# Patient Record
Sex: FEMALE | Race: WHITE | NOT HISPANIC OR LATINO | Employment: FULL TIME | URBAN - METROPOLITAN AREA
[De-identification: names, ages, dates, MRNs, and addresses within clinical notes are randomized per-mention and may not be internally consistent; named-entity substitution may affect disease eponyms.]

---

## 2017-02-15 ENCOUNTER — OFFICE VISIT (OUTPATIENT)
Dept: ENDOCRINOLOGY | Facility: CLINIC | Age: 42
End: 2017-02-15
Payer: COMMERCIAL

## 2017-02-15 VITALS
HEIGHT: 65 IN | BODY MASS INDEX: 32.91 KG/M2 | DIASTOLIC BLOOD PRESSURE: 76 MMHG | SYSTOLIC BLOOD PRESSURE: 108 MMHG | HEART RATE: 88 BPM | RESPIRATION RATE: 16 BRPM | WEIGHT: 197.56 LBS

## 2017-02-15 DIAGNOSIS — E55.9 VITAMIN D DEFICIENCY: ICD-10-CM

## 2017-02-15 DIAGNOSIS — E66.9 OBESITY, CLASS I, BMI 30-34.9: ICD-10-CM

## 2017-02-15 DIAGNOSIS — E03.4 HYPOTHYROIDISM DUE TO ACQUIRED ATROPHY OF THYROID: ICD-10-CM

## 2017-02-15 PROBLEM — E66.811 OBESITY, CLASS I, BMI 30-34.9: Status: ACTIVE | Noted: 2017-02-15

## 2017-02-15 PROCEDURE — 99204 OFFICE O/P NEW MOD 45 MIN: CPT | Mod: S$GLB,,, | Performed by: INTERNAL MEDICINE

## 2017-02-15 PROCEDURE — 99999 PR PBB SHADOW E&M-NEW PATIENT-LVL III: CPT | Mod: PBBFAC,,, | Performed by: INTERNAL MEDICINE

## 2017-02-15 RX ORDER — LEVOTHYROXINE SODIUM 175 UG/1
175 TABLET ORAL DAILY
COMMUNITY
End: 2017-02-16 | Stop reason: DRUGHIGH

## 2017-02-15 NOTE — MR AVS SNAPSHOT
[facility]  [address]  [facility] [address]  Phone: [phone]  Fax: [phone]                  Zenaida Sung   2/15/2017 8:00 AM   Office Visit    Description:  Female : 1975   Provider:  Evelina Elam NP   Department:  [facility]           Reason for Visit     Hypothyroidism           Diagnoses this Visit        Comments    Hypothyroidism due to acquired atrophy of thyroid         Vitamin D deficiency         Obesity, Class I, BMI 30-34.9                To Do List           Future Appointments        Provider Department Dept Phone    2/15/2017 9:40 AM LAB, APPOINTMENT [place] [facility] [phone]      Goals (5 Years of Data)     None      Follow-Up and Disposition     Return in about 6 months (around 8/15/2017).      [facility]     [facility] Care [facility] - 24/ Assistance  Registered nurses in the [facility] provide clinical advisement, health education, appointment booking, and other advisory services.  Call for this free service at [phone].             Medications           Message regarding Medications     Verify the changes and/or additions to your medication regime listed below are the same as discussed with your clinician today.  If any of these changes or additions are incorrect, please notify your healthcare provider.             Verify that the below list of medications is an accurate representation of the medications you are currently taking.  If none reported, the list may be blank. If incorrect, please contact your healthcare provider. Carry this list with you in case of emergency.           Current Medications     levothyroxine (SYNTHROID, LEVOTHROID) 175 MCG tablet Take 175 mcg by mouth once daily.           Clinical Reference Information           Your Vitals Were     BP Pulse Resp Height Weight BMI    108/76 (BP Location: Left arm, Patient Position:  "Sitting, BP Method: Manual) 88 16 5' 5" (1.651 m) 89.6 kg (197 lb 8.5 oz) 32.87 kg/m2      Blood Pressure          Most Recent Value    BP  108/76      Allergies as of 2/15/2017     No Known Allergies      Immunizations Administered on Date of Encounter - 2/15/2017     None      Orders Placed During Today's Visit     Future Labs/Procedures Expected by Expires    Hemoglobin A1c  2/15/2017 4/16/2018    Thyroid peroxidase antibody  2/15/2017 4/16/2018    TSH  2/15/2017 (Approximate) 2/10/2018    Vitamin D  2/15/2017 4/16/2018      Language Assistance Services     ATTENTION: Language assistance services are available, free of charge. Please call [phone].      ATENCIÓN: Si veronala barry, tiene a munoz disposición servicios gratuitos de asistencia lingüística. Llame al [phone].     CHÚ Ý: N?u b?n nói Ti?ng Vi?t, có các d?ch v? h? tr? ngôn ng? mi?n phí dành cho b?n. G?i s? [phone].         [facility] complies with applicable Federal civil rights laws and does not discriminate on the basis of race, color, national origin, age, disability, or sex.        "

## 2017-02-15 NOTE — PROGRESS NOTES
Chief Complaint: Hypothyroidism      HPI:  Zenaida Sung is 41 y.o. female with preexisting thyroid disease. Diagnosed in  , presented with symptoms of weight gain of 50+ pounds over 6 months and fatigue   Found to abnormal TFTs on routine labs performed and was found to be hypothyroid    Current Medications: Levothyroxine 175 mcg - 1 tab po daily     Patient reports good compliance with taking medication as prescribed   Takes LT4 separate from food and other medications first thing in the morning with coffee. Pt reports waiting 45mins - 1 hour before eating or taking other medications      Patient endorses dry skin  +inability to lose weight   + thinning hair and nails   + cold intolerance   + fatigue       Review of Systems   Constitutional: Positive for fatigue. Negative for unexpected weight change.   HENT: Negative for trouble swallowing and voice change.    Eyes: Negative for visual disturbance.   Respiratory: Negative for cough and shortness of breath.    Cardiovascular: Negative for chest pain and palpitations.   Gastrointestinal: Positive for abdominal distention. Negative for abdominal pain, constipation, diarrhea, nausea and vomiting.   Endocrine: Positive for cold intolerance. Negative for heat intolerance, polydipsia, polyphagia and polyuria.   Genitourinary: Negative for dysuria and menstrual problem.   Musculoskeletal: Negative for back pain, neck pain and neck stiffness.   Skin: Negative for rash and wound.   Allergic/Immunologic: Negative for immunocompromised state.   Neurological: Negative for dizziness, tremors, weakness, light-headedness and headaches.   Hematological: Does not bruise/bleed easily.   Psychiatric/Behavioral: Negative for confusion and sleep disturbance.     Family history of thyroid disease: Yes, 1 cousin with Hypothyroidism     Menses: Regular   LMP: 2016   Reproductive history:        Physical Exam   Constitutional: She is oriented to person, place, and  time. She appears well-developed and well-nourished. No distress.   HENT:   Right Ear: External ear normal.   Left Ear: External ear normal.   Nose: Nose normal.   Hearing normal  Dentition good    Neck: Normal range of motion. Neck supple. No tracheal deviation present. No thyromegaly present.   Cardiovascular: Normal rate and regular rhythm.    No murmur heard.  Pulmonary/Chest: Effort normal and breath sounds normal.   Abdominal: Soft. There is no tenderness. No hernia.   Musculoskeletal: She exhibits no edema.   Gait normal  No clubbing or cyanosis noted   Lymphadenopathy:     She has no cervical adenopathy.   Neurological: She is alert and oriented to person, place, and time. She displays normal reflexes. No cranial nerve deficit.   Skin: Skin is warm and dry. No rash noted.   No nodules       Psychiatric: She has a normal mood and affect. Judgment normal.   Nursing note and vitals reviewed.      Assessment and Plan:  1. Hypothyroidism due to acquired atrophy of thyroid  - check lab today along with TPO AB   - clinically euthyroid   - discussed that symptoms of hypothyroidism may not always correlate with TSH and a normal TSH is the goal of therapy   - reviewed usual times of thyroid hormone changes: weight gain, start /stop OCP's , attempting conception and pregnancy   - avoid exogenous hyperthyroidism as this can accelerate bone loss and increase CV complications   -     TSH; Future; Expected date: 2/15/17  -     Thyroid peroxidase antibody; Future; Expected date: 2/15/17  -     Hemoglobin A1c; Future; Expected date: 2/15/17    2. Vitamin D deficiency  -     Vitamin D; Future; Expected date: 2/15/17    3. Obesity, Class I, BMI 30-34.9  - increases insulin resistance   - alerted as to the increased risk of diabetes   - recommend periodic A1c  - encouraged exercise and weight loss   - encouraged to keep food diary   -     Hemoglobin A1c; Future; Expected date: 2/15/17        Case discussed in consultation with  Dr. Santiago   Recommendations were discussed with the patient in detail   The patient verbalized understanding and agrees with the treatment plan as outlined above     RTC in 6 months for follow up with labs prior

## 2017-02-16 ENCOUNTER — PATIENT MESSAGE (OUTPATIENT)
Dept: ENDOCRINOLOGY | Facility: CLINIC | Age: 42
End: 2017-02-16

## 2017-02-16 DIAGNOSIS — E03.4 HYPOTHYROIDISM DUE TO ACQUIRED ATROPHY OF THYROID: Primary | ICD-10-CM

## 2017-02-16 RX ORDER — LEVOTHYROXINE SODIUM 150 UG/1
150 TABLET ORAL DAILY
Qty: 30 TABLET | Refills: 1 | Status: SHIPPED | OUTPATIENT
Start: 2017-02-16 | End: 2017-04-19 | Stop reason: SDUPTHER

## 2017-02-17 ENCOUNTER — PATIENT MESSAGE (OUTPATIENT)
Dept: ENDOCRINOLOGY | Facility: CLINIC | Age: 42
End: 2017-02-17

## 2017-04-19 DIAGNOSIS — E03.4 HYPOTHYROIDISM DUE TO ACQUIRED ATROPHY OF THYROID: ICD-10-CM

## 2017-04-19 RX ORDER — LEVOTHYROXINE SODIUM 150 UG/1
TABLET ORAL
Qty: 30 TABLET | Refills: 0 | Status: SHIPPED | OUTPATIENT
Start: 2017-04-19 | End: 2017-05-08 | Stop reason: DRUGHIGH

## 2017-05-08 ENCOUNTER — PATIENT MESSAGE (OUTPATIENT)
Dept: ENDOCRINOLOGY | Facility: CLINIC | Age: 42
End: 2017-05-08

## 2017-05-08 ENCOUNTER — LAB VISIT (OUTPATIENT)
Dept: LAB | Facility: HOSPITAL | Age: 42
End: 2017-05-08
Payer: COMMERCIAL

## 2017-05-08 DIAGNOSIS — E03.4 HYPOTHYROIDISM DUE TO ACQUIRED ATROPHY OF THYROID: Primary | ICD-10-CM

## 2017-05-08 DIAGNOSIS — E03.4 HYPOTHYROIDISM DUE TO ACQUIRED ATROPHY OF THYROID: ICD-10-CM

## 2017-05-08 LAB
T4 FREE SERPL-MCNC: 1.43 NG/DL
TSH SERPL DL<=0.005 MIU/L-ACNC: <0.01 UIU/ML

## 2017-05-08 PROCEDURE — 84443 ASSAY THYROID STIM HORMONE: CPT

## 2017-05-08 PROCEDURE — 36415 COLL VENOUS BLD VENIPUNCTURE: CPT

## 2017-05-08 PROCEDURE — 84439 ASSAY OF FREE THYROXINE: CPT

## 2017-05-08 RX ORDER — LEVOTHYROXINE SODIUM 125 UG/1
125 TABLET ORAL
Qty: 30 TABLET | Refills: 3 | Status: SHIPPED | OUTPATIENT
Start: 2017-05-08 | End: 2017-08-07 | Stop reason: DRUGHIGH

## 2017-07-27 ENCOUNTER — PATIENT MESSAGE (OUTPATIENT)
Dept: ENDOCRINOLOGY | Facility: CLINIC | Age: 42
End: 2017-07-27

## 2017-08-04 ENCOUNTER — LAB VISIT (OUTPATIENT)
Dept: LAB | Facility: HOSPITAL | Age: 42
End: 2017-08-04
Payer: COMMERCIAL

## 2017-08-04 DIAGNOSIS — E03.4 HYPOTHYROIDISM DUE TO ACQUIRED ATROPHY OF THYROID: ICD-10-CM

## 2017-08-04 LAB
T4 FREE SERPL-MCNC: 1.17 NG/DL
TSH SERPL DL<=0.005 MIU/L-ACNC: 0.11 UIU/ML

## 2017-08-04 PROCEDURE — 84439 ASSAY OF FREE THYROXINE: CPT

## 2017-08-04 PROCEDURE — 36415 COLL VENOUS BLD VENIPUNCTURE: CPT

## 2017-08-04 PROCEDURE — 84443 ASSAY THYROID STIM HORMONE: CPT

## 2017-08-07 ENCOUNTER — PATIENT MESSAGE (OUTPATIENT)
Dept: ENDOCRINOLOGY | Facility: CLINIC | Age: 42
End: 2017-08-07

## 2017-08-07 DIAGNOSIS — E03.4 HYPOTHYROIDISM DUE TO ACQUIRED ATROPHY OF THYROID: Primary | ICD-10-CM

## 2017-08-07 RX ORDER — LEVOTHYROXINE SODIUM 112 UG/1
112 TABLET ORAL
Qty: 30 TABLET | Refills: 6 | Status: SHIPPED | OUTPATIENT
Start: 2017-08-07 | End: 2018-03-27 | Stop reason: SDUPTHER

## 2017-10-09 ENCOUNTER — PATIENT MESSAGE (OUTPATIENT)
Dept: ENDOCRINOLOGY | Facility: CLINIC | Age: 42
End: 2017-10-09

## 2017-10-25 ENCOUNTER — PATIENT MESSAGE (OUTPATIENT)
Dept: ENDOCRINOLOGY | Facility: CLINIC | Age: 42
End: 2017-10-25

## 2017-10-25 DIAGNOSIS — E03.4 HYPOTHYROIDISM DUE TO ACQUIRED ATROPHY OF THYROID: Primary | ICD-10-CM

## 2017-12-01 ENCOUNTER — HOSPITAL ENCOUNTER (OUTPATIENT)
Dept: RADIOLOGY | Facility: HOSPITAL | Age: 42
Discharge: HOME | End: 2017-12-01
Attending: PHYSICIAN ASSISTANT
Payer: COMMERCIAL

## 2017-12-01 ENCOUNTER — OFFICE VISIT (OUTPATIENT)
Dept: ORTHOPEDICS | Facility: CLINIC | Age: 42
End: 2017-12-01
Payer: COMMERCIAL

## 2017-12-01 DIAGNOSIS — M25.569 KNEE PAIN, UNSPECIFIED CHRONICITY, UNSPECIFIED LATERALITY: ICD-10-CM

## 2017-12-01 DIAGNOSIS — M17.0 PRIMARY OSTEOARTHRITIS OF BOTH KNEES: Primary | ICD-10-CM

## 2017-12-01 PROCEDURE — 73564 X-RAY EXAM KNEE 4 OR MORE: CPT | Mod: 26,50,, | Performed by: RADIOLOGY

## 2017-12-01 PROCEDURE — 20610 DRAIN/INJ JOINT/BURSA W/O US: CPT | Mod: 50,S$GLB,, | Performed by: PHYSICIAN ASSISTANT

## 2017-12-01 PROCEDURE — 99999 PR PBB SHADOW E&M-EST. PATIENT-LVL II: CPT | Mod: PBBFAC,,, | Performed by: PHYSICIAN ASSISTANT

## 2017-12-01 PROCEDURE — 99203 OFFICE O/P NEW LOW 30 MIN: CPT | Mod: 25,S$GLB,, | Performed by: PHYSICIAN ASSISTANT

## 2017-12-01 PROCEDURE — 73564 X-RAY EXAM KNEE 4 OR MORE: CPT | Mod: TC,50

## 2017-12-01 RX ORDER — MELOXICAM 15 MG/1
15 TABLET ORAL DAILY
Qty: 30 TABLET | Refills: 0 | Status: SHIPPED | OUTPATIENT
Start: 2017-12-01 | End: 2018-03-05 | Stop reason: SDUPTHER

## 2017-12-01 RX ORDER — TRIAMCINOLONE ACETONIDE 40 MG/ML
80 INJECTION, SUSPENSION INTRA-ARTICULAR; INTRAMUSCULAR
Status: COMPLETED | OUTPATIENT
Start: 2017-12-01 | End: 2017-12-01

## 2017-12-01 RX ORDER — PHENTERMINE HYDROCHLORIDE 37.5 MG/1
37.5 TABLET ORAL
COMMUNITY
End: 2019-03-20

## 2017-12-01 RX ADMIN — TRIAMCINOLONE ACETONIDE 80 MG: 40 INJECTION, SUSPENSION INTRA-ARTICULAR; INTRAMUSCULAR at 12:12

## 2017-12-01 NOTE — PROGRESS NOTES
SUBJECTIVE:     Chief Complaint : bilateral knee pain     History of Present Illness:  Zenaida Sung is a 42 y.o. female who works for [employer] seen in clinic today with a chief complaint of bilateral knee pain. Pain has been present for over 2 years. Pain is worst when attempting to squat or kneel. She has stopped Robles and running because of the knee pain. After standing for a day her knees swell. She denies catching/locking, instability. She has had a left knee injection 2 years ago that helped some. She takes ibuprofen a few mornings a week.     Past Medical History:   Diagnosis Date    Hypothyroidism        Review of Systems:  Constitutional: no fever or chills  ENT: no nasal congestion or sore throat  Respiratory: no cough or shortness of breath  Cardiovascular: no chest pain or palpitations  Gastrointestinal: no nausea or vomiting, tolerating diet  Genitourinary: no hematuria or dysuria  Integument/Breast: no rash or pruritis  Hematologic/Lymphatic: no easy bruising or lymphadenopathy  Musculoskeletal: see HPI  Neurological: no seizures or tremors  Behavioral/Psych: no auditory or visual hallucinations    OBJECTIVE:     PHYSICAL EXAM:  There were no vitals taken for this visit.   General Appearance: WDWN, NAD  Gait: Normal  Neuro/Psych: Mood & affect appropriate  Lungs: Respirations equal and unlabored.   CV: 2+ bilateral upper and lower extremity pulses.   Skin: Intact throughout LE  Extremities: No LE edema    Right Knee Exam  Range of Motion:0-125 active   Effusion:yes  Condition of skin:intact  Location of tenderness:Patellar tendon   Strength:5 of 5 quadriceps strength and 5 of 5 hamstring strength  Stability:stable to testing  Elissa: negative/negative    Left Knee Exam  Range of Motion:0-125 active   Effusion:none  Condition of skin:intact  Location of tenderness:Patellar tendon   Strength:5 of 5 quadriceps strength and 5 of 5 hamstring strength  Stability:stable to  testing  Elissa: negative/negative    Alignment: valgus in flexion    Right Hip Examination: no pain with PROM     Left Hip Examination: no pain with PROM     RADIOGRAPHS: AP, lateral and merchant bilateral knee x-rays ordered and images reviewed today by me reveal mild-moderate degenerative changes primarily patellofemoral    ASSESSMENT/PLAN:   Patellofemoral arthritis  Patellar tendonitis  - Activity modification. Avoid squats, lunges, jumping, Robles. Will try elliptical, swimming.  - Continue weight loss.   - Steroid injections bilaterally today.  - Euflexxa brochure given. She will call if she would like to try in the future.    Knee Injection Procedure Note    Pre-operative Diagnosis: bilateral knee degenerative arthritis    Post-operative Diagnosis: same    Indications: bilateral knee pain    Anesthesia: none    Procedure Details     Verbal consent was obtained for the procedure. The injection site was identified and the skin was prepared with alcohol. The bilateral knee was injected from an anterolateral approach with 1 ml of Kenalog and 3 ml Lidocaine under sterile technique using a 22 gauge needle. The needle was removed and the area cleansed and dressed.    Complications:  None; patient tolerated the procedure well.    she was advised to rest the knee today, using ice and elevation as needed for comfort and swelling. she did receive immediate relief of the knee pain. she was told this would be short lived and is secondary to the lidocaine. she may have an increase in discomfort tonight followed by steady improvement over the next several days. It may take 1-3 weeks following the injection to get the full benefit of the medication.

## 2018-01-11 ENCOUNTER — PATIENT MESSAGE (OUTPATIENT)
Dept: ENDOCRINOLOGY | Facility: CLINIC | Age: 43
End: 2018-01-11

## 2018-01-12 ENCOUNTER — LAB VISIT (OUTPATIENT)
Dept: LAB | Facility: HOSPITAL | Age: 43
End: 2018-01-12
Payer: COMMERCIAL

## 2018-01-12 DIAGNOSIS — E03.4 HYPOTHYROIDISM DUE TO ACQUIRED ATROPHY OF THYROID: ICD-10-CM

## 2018-01-12 LAB — TSH SERPL DL<=0.005 MIU/L-ACNC: 0.46 UIU/ML

## 2018-01-12 PROCEDURE — 84443 ASSAY THYROID STIM HORMONE: CPT

## 2018-01-12 PROCEDURE — 36415 COLL VENOUS BLD VENIPUNCTURE: CPT

## 2018-03-05 ENCOUNTER — PATIENT MESSAGE (OUTPATIENT)
Dept: ORTHOPEDICS | Facility: CLINIC | Age: 43
End: 2018-03-05

## 2018-03-05 DIAGNOSIS — M17.0 PRIMARY OSTEOARTHRITIS OF BOTH KNEES: ICD-10-CM

## 2018-03-05 DIAGNOSIS — M25.569 KNEE PAIN, UNSPECIFIED CHRONICITY, UNSPECIFIED LATERALITY: ICD-10-CM

## 2018-03-05 RX ORDER — MELOXICAM 15 MG/1
TABLET ORAL
Qty: 30 TABLET | Refills: 0 | Status: SHIPPED | OUTPATIENT
Start: 2018-03-05 | End: 2019-03-20

## 2018-03-27 DIAGNOSIS — E03.4 HYPOTHYROIDISM DUE TO ACQUIRED ATROPHY OF THYROID: ICD-10-CM

## 2018-03-27 RX ORDER — LEVOTHYROXINE SODIUM 112 UG/1
TABLET ORAL
Qty: 30 TABLET | Refills: 6 | Status: SHIPPED | OUTPATIENT
Start: 2018-03-27 | End: 2018-11-08 | Stop reason: SDUPTHER

## 2018-04-18 ENCOUNTER — LAB VISIT (OUTPATIENT)
Dept: LAB | Facility: HOSPITAL | Age: 43
End: 2018-04-18
Attending: ALLERGY & IMMUNOLOGY
Payer: COMMERCIAL

## 2018-04-18 ENCOUNTER — OFFICE VISIT (OUTPATIENT)
Dept: ALLERGY | Facility: CLINIC | Age: 43
End: 2018-04-18
Payer: COMMERCIAL

## 2018-04-18 VITALS — BODY MASS INDEX: 31.44 KG/M2 | WEIGHT: 188.69 LBS | HEART RATE: 100 BPM | OXYGEN SATURATION: 96 % | HEIGHT: 65 IN

## 2018-04-18 DIAGNOSIS — J31.0 OTHER CHRONIC RHINITIS: ICD-10-CM

## 2018-04-18 DIAGNOSIS — E03.4 HYPOTHYROIDISM DUE TO ACQUIRED ATROPHY OF THYROID: ICD-10-CM

## 2018-04-18 DIAGNOSIS — J45.30 MILD PERSISTENT ASTHMA WITHOUT COMPLICATION: Primary | ICD-10-CM

## 2018-04-18 DIAGNOSIS — E66.9 OBESITY, CLASS I, BMI 30-34.9: ICD-10-CM

## 2018-04-18 PROCEDURE — 99999 PR PBB SHADOW E&M-EST. PATIENT-LVL III: CPT | Mod: PBBFAC,,, | Performed by: ALLERGY & IMMUNOLOGY

## 2018-04-18 PROCEDURE — 99203 OFFICE O/P NEW LOW 30 MIN: CPT | Mod: S$GLB,,, | Performed by: ALLERGY & IMMUNOLOGY

## 2018-04-18 PROCEDURE — 86003 ALLG SPEC IGE CRUDE XTRC EA: CPT | Mod: 59

## 2018-04-18 PROCEDURE — 86003 ALLG SPEC IGE CRUDE XTRC EA: CPT

## 2018-04-18 PROCEDURE — 36415 COLL VENOUS BLD VENIPUNCTURE: CPT | Mod: PO

## 2018-04-18 PROCEDURE — 82785 ASSAY OF IGE: CPT

## 2018-04-18 RX ORDER — MONTELUKAST SODIUM 10 MG/1
10 TABLET ORAL NIGHTLY
Qty: 30 TABLET | Refills: 6 | Status: SHIPPED | OUTPATIENT
Start: 2018-04-18

## 2018-04-18 RX ORDER — PREDNISONE 20 MG/1
20 TABLET ORAL 3 TIMES DAILY
Qty: 18 TABLET | Refills: 0 | Status: SHIPPED | OUTPATIENT
Start: 2018-04-18 | End: 2018-04-24

## 2018-04-18 RX ORDER — FLUTICASONE PROPIONATE AND SALMETEROL 250; 50 UG/1; UG/1
1 POWDER RESPIRATORY (INHALATION) 2 TIMES DAILY
Qty: 1 EACH | Refills: 6 | Status: SHIPPED | OUTPATIENT
Start: 2018-04-18 | End: 2018-05-09

## 2018-04-18 RX ORDER — CETIRIZINE HYDROCHLORIDE 10 MG/1
10 TABLET ORAL DAILY
COMMUNITY

## 2018-04-18 RX ORDER — FLUTICASONE PROPIONATE 50 MCG
1 SPRAY, SUSPENSION (ML) NASAL DAILY
COMMUNITY

## 2018-04-18 RX ORDER — GUAIFENESIN 600 MG/1
600 TABLET, EXTENDED RELEASE ORAL 2 TIMES DAILY
COMMUNITY
End: 2018-05-09

## 2018-04-18 NOTE — PROGRESS NOTES
Referring physician: No ref. provider found    Primary Care Physician: Primary Doctor No    Chief Complaint: Sinus Problem (headaches and pressure); Wheezing; and Cough    Patient is new to me.  Patient is not accompanied    Subjective:         HPI    Zenaida Sung is a 42 y.o. female self-referred for evaluation of respiratory symptoms that started approximately 4 weeks ago.  She stated that it began as it itchy throat with sneezing which persisted then she developed some wheezing which responds to albuterol.  She states the wheezing occurs in the a.m. when she gets up and at bedtime before she goes to sleep; she uses albuterol these times with benefit.  She also reports having a persistent recurrent cough.  She has been on Zyrtec at bedtime for the past 4 weeks as well as Flonase 2 sprays in each nostril every a.m.  At time she uses the Flonase twice a day.  She has been using the albuterol for the past 4 weeks on an as-needed basis.  The albuterol she has been using was previously prescribed to her wife.  Patient states that she has never been diagnosed with asthma per se; this is the first time she's actually had wheezing.  The patient does report that  during pregnancy 19 years ago she had itchy watery eyes along with sneezing which lasted throughout her pregnancy   And resolved after delivery.  She also states that she had bronchitis in December 2005 as well as several sinus infections from 2005 thru 2006.    She states that in December 2017 both she and her wife became [place] seen in urgent care.  The doctor to urgent care gave her a steroid injection as well as an antibiotic.  Patient reports that she got completely well after this treatment.    Her current symptoms include nasal congestion, rhinorrhea, postnasal drip, frequent clearing of her throat, as well as shortness of breath, coughing, and wheezing.  She also states that she has a headache frequently in the last 2 weeks at the top of her  head.  She also reports watery eyes.    Her father has a history of hayfever as well as her brother.  She states that her brother got ALLERGY injections when he was a teenager.  Both her father and her brother have sleep apnea and on CPAP.    The only new environmental change is that she has gotten a new kitten (Bengal) approximately 3 weeks ago; her nasal symptoms started prior to obtaining this kitten but perhaps her wheezing occurred afterwards.    .  She's had cats in the past without, so she does not think this Is causing her issues.            Review of Systems  Constitutional: Negative for changes in appetite, unintentional weight loss, fever, chills and fatigue.   HENT: Negative for facial pain, nose bleeds, sinus pressure, and voice change. Negative for ear discharge, ear pain, facial swelling, sore throat and trouble swallowing. positive for nasal congestion, postnasal drip, clearing of her throat, sneezing, and rhinorrhea, also positive for itchy throat.    Eyes: Negative for occular discharge, redness, itching and visual disturbance.  Respiratory: Negative for chest tightness, dyspnea on exertion, sputum production.  positive for shortness breath, coughing, wheezing,   Cardiovascular: Negative for chest pain, palpatations and leg swelling.  Gastrointestinal: Negative for abdominal distension, abdominal pain, constipation, diarrhea, nausea,and vomiting.   Genitourinary: Negative for difficulty urinating.   Musculoskeletal: Negative for arthralgias, gait problems, joint swelling, myalgias and back pain.   Neurological: Negative for dizziness, syncope, weakness, light-headedness, and headaches.   Hematological: Negative for adenopathy, does not bruise or bleed easily.  Psychiatric/Behavioral: Negative for agitation, anxiety, behavioral problems, confusion, and insomnia.  Skin: Negative for rash.     PMH:  Reviewed with the patient and current for this visit    Family History:  Reviewed with the patient and  current for this visit    Social History:  Reviewed with the patient and current for this visit     Environmental History:  Reviewed with the patient and current for this visit          Objective:      Skin Test results: patient has never had ALLERGY skin testing.      Immunotherapy: patient has never been on allergen specific immunotherapy.      Physical Exam  General:patient is well developed and well nourished, in no acute distress. patient is overweight   Mental Status:  Alert, oriented and cooperative  Head and Face: normocephalic   Allergic shiners: No  Eyes:   Pupils: ERRLA: Scleral conjunctiva: clear; Cornea: clear; Palprebal conjunctiva: normal: Eyelid Skin: normal  Ears:Tympanic membrane Left:intact and normal light reflex; Right:intact and normal light reflex; External canals normal bilaterally.  Nose:  Nares: patent; Mucosa : Boggy and congested; Nasal septum: midline;Turbinates are swollen bilaterally and do not occlude the nasal airway.  Mouth/Pharynx: tonsils present; posterior pharyngeal wall normal; tongue normal; teeth normal; voice quality normal.  Neck:  Cervical lymph nodes: small, non-tender, freely moveable both anterior and posterior cervical chain; Trachea: midline; Masses: none  Lungs: Air movement is good; respiratory effort is good; no respiratory distress; breath sounds are vesicular in all lung fields; no wheezing; normal expiratory time; an intermittent dry hacking cough is present.  Heart: regular rate and rhythm with mild respiratory variation; A1 and P2 are normal; no murmurs or gallops.  Abdomen:exam not done  Extremities: no cyanosis, clubbing or edema  Skin:no rashes or lesions present; skin hydrated and supple.    Skin Test Results: Deferred as patient is on Zyrtec.         Assessment:       1. New-onset Mild persistent asthma without complication: Symptomatic     2. Other chronic rhinitis  D. farinae IgE    D. pteronyssinus IgE    Dog dander IgE    ALLERGEN CAT EPITHELLIUM     IgE    Oak, white IgE   3. Hypothyroidism due to acquired atrophy of thyroid     4. Obesity, Class I, BMI 30-34.9             Plan:         Return for re-visit: Follow-up in about 4 weeks (around 5/16/2018).    Immunotherapy: No    Lab or X-ray: No    Outside medical records requested: No        Patient Instructions   1.  Patient is to start prednisone 20 mg 3 times a day for the next 6 days.  2.  Patient is to continue Flonase 2 sprays in each nostril twice a day  3.  Patient is to start either Singulair 10 mg every 24 hours or Advair 250/50 Diskus 1 puff inhaled twice a day depending on her cost for these medicines.  4.  Patient is to continue albuterol 2 puffs every 4-6 hours as needed.  5.  Patient is to continue Zyrtec every 24 hours for now.  6.  If needed patient is to use Zatidor OTC ophthalmic drops 2 drops in each eye every 8-12 hours as needed or to prevent symptoms.  7.  I requested a revisit in approximately 2 weeks to assess her progress.  Also ordered lab today and patient is to check her portal for the results of these studies.      Patient education content included: Nasal sinus physiology reviewed.  The role of inflammatory changes in symptom production was reviewed.  The role of sinusitis in producing postnasal drip and cough was reviewed.  The role of sinobronchial pathways in producing cough was reviewed. The role of reflux disease or gastroesophageal or laryngeal pharyngeal in producing cough was reviewed.  The role of using anti-inflammatory drugs to reduce the level of inflammation and thus the symptoms was reviewed.  The handout for rhinitis and sinusitis was reviewed with the patient.  Patient expressed understanding of these concepts and questions were answered.   Asthma pathophysiology was reviewed.  The role of inflammatory changes in the mucosal lining in producing smooth muscle contraction and thus airway narrowing was reviewed.  The role of an inhaled corticosteroids in reducing this  airway inflammation was reviewed. Reduction of airway inflammation results in the reduction smooth muscle contraction and thus eliminates airway narrowing and compromise.  The use of albuterol as a rescue inhaler was reviewed; if asthma is well controlled, the issues should be less than 2 puffs inhaled every 2 weeks.  If more albuterol than this is needed, it is an indication that asthma is not well controlled.  The various triggers for asthma symptoms was reviewed including allergen exposure, irritant exposure, viral infections, sinusitis, and reflux.  The handout for understanding asthma and asthma medications was reviewed with this patient.  The patient expressed understanding of these concepts and questions were answered.          Allegra Mojica MD

## 2018-04-18 NOTE — PATIENT INSTRUCTIONS
1.  Patient is to start prednisone 20 mg 3 times a day for the next 6 days.  2.  Patient is to continue Flonase 2 sprays in each nostril twice a day  3.  Patient is to start either Singulair 10 mg every 24 hours or Advair 250/50 Diskus 1 puff inhaled twice a day depending on her cost for these medicines.  4.  Patient is to continue albuterol 2 puffs every 4-6 hours as needed.  5.  Patient is to continue Zyrtec every 24 hours for now.  6.  If needed patient is to use Zatidor OTC ophthalmic drops 2 drops in each eye every 8-12 hours as needed or to prevent symptoms.  7.  I requested a revisit in approximately 2 weeks to assess her progress.  Also ordered lab today and patient is to check her portal for the results of these studies.

## 2018-04-19 LAB — IGE SERPL-ACNC: 507 IU/ML

## 2018-04-20 LAB
CAT DANDER IGE QN: 93.2 KU/L
D FARINAE IGE QN: <0.35 KU/L
D PTERONYSS IGE QN: <0.35 KU/L
DEPRECATED CAT DANDER IGE RAST QL: ABNORMAL
DEPRECATED D FARINAE IGE RAST QL: NORMAL
DEPRECATED D PTERONYSS IGE RAST QL: NORMAL
DEPRECATED DOG DANDER IGE RAST QL: ABNORMAL
DEPRECATED WHITE OAK IGE RAST QL: NORMAL
DOG DANDER IGE QN: 16.4 KU/L
WHITE OAK IGE QN: <0.35 KU/L

## 2018-04-22 ENCOUNTER — PATIENT MESSAGE (OUTPATIENT)
Dept: ALLERGY | Facility: CLINIC | Age: 43
End: 2018-04-22

## 2018-05-09 ENCOUNTER — OFFICE VISIT (OUTPATIENT)
Dept: ALLERGY | Facility: CLINIC | Age: 43
End: 2018-05-09
Payer: COMMERCIAL

## 2018-05-09 ENCOUNTER — TELEPHONE (OUTPATIENT)
Dept: ALLERGY | Facility: CLINIC | Age: 43
End: 2018-05-09

## 2018-05-09 VITALS
HEIGHT: 65 IN | DIASTOLIC BLOOD PRESSURE: 80 MMHG | WEIGHT: 188.25 LBS | HEART RATE: 97 BPM | BODY MASS INDEX: 31.36 KG/M2 | OXYGEN SATURATION: 93 % | SYSTOLIC BLOOD PRESSURE: 112 MMHG

## 2018-05-09 DIAGNOSIS — E03.4 HYPOTHYROIDISM DUE TO ACQUIRED ATROPHY OF THYROID: Chronic | ICD-10-CM

## 2018-05-09 DIAGNOSIS — J30.81 ALLERGIC RHINITIS DUE TO ANIMAL (CAT) (DOG) HAIR AND DANDER: Chronic | ICD-10-CM

## 2018-05-09 DIAGNOSIS — H10.45 OTHER CHRONIC ALLERGIC CONJUNCTIVITIS OF BOTH EYES: Chronic | ICD-10-CM

## 2018-05-09 DIAGNOSIS — J45.30 MILD PERSISTENT ASTHMA WITHOUT COMPLICATION: Primary | ICD-10-CM

## 2018-05-09 DIAGNOSIS — E66.9 OBESITY, CLASS I, BMI 30-34.9: Chronic | ICD-10-CM

## 2018-05-09 PROCEDURE — 99214 OFFICE O/P EST MOD 30 MIN: CPT | Mod: S$GLB,,, | Performed by: ALLERGY & IMMUNOLOGY

## 2018-05-09 PROCEDURE — 3008F BODY MASS INDEX DOCD: CPT | Mod: CPTII,S$GLB,, | Performed by: ALLERGY & IMMUNOLOGY

## 2018-05-09 PROCEDURE — 99999 PR PBB SHADOW E&M-EST. PATIENT-LVL III: CPT | Mod: PBBFAC,,, | Performed by: ALLERGY & IMMUNOLOGY

## 2018-05-09 NOTE — PROGRESS NOTES
Referring physician: No ref. provider found    Primary Care Physician: Primary Doctor No    Chief Complaint: Follow-up    Patient is known to me. The last visit was 04/18/2018  Patient is accompanied: No    Subjective:         HPI    Zenaida Sung is a 42 y.o. female here for a follow-up visit related to chronic respiratory symptoms.  Patient states that after her last visit she started Singulair 10 mg every 24 hr.  She states that after finishing her Prednisone, her chest symptoms have been completely resolved on the Singulair and the Flonase.  She reports she has not needed her albuterol.  She states that her nasal and ocular symptoms have been well controlled on Singulair and Flonase.  She states that when she traveled over the past week and she did not need her Zyrtec but when she returned home she started using it again as a prophylactic measure.    Summary of visit with me on 4/18/2018:  Zenaida Sung is a 42 y.o. female self-referred for evaluation of respiratory symptoms that started approximately 4 weeks ago.  She stated that it began as it itchy throat with sneezing which persisted then she developed some wheezing which responds to albuterol.  She states the wheezing occurs in the a.m. when she gets up and at bedtime before she goes to sleep; she uses albuterol these times with benefit.  She also reports having a persistent recurrent cough.  She has been on Zyrtec at bedtime for the past 4 weeks as well as Flonase 2 sprays in each nostril every a.m.  At time she uses the Flonase twice a day.  She has been using the albuterol for the past 4 weeks on an as-needed basis.  The albuterol she has been using was previously prescribed to her wife.  Patient states that she has never been diagnosed with asthma per se; this is the first time she's actually had wheezing.  The patient does report that  during pregnancy 19 years ago she had itchy watery eyes along with sneezing which lasted  throughout her pregnancy   And resolved after delivery.  She also states that she had bronchitis in December 2005 as well as several sinus infections from 2005 thru 2006.  She states that in December 2017 both she and her wife became sick and were seen in urgent care.  The doctor to urgent care gave her a steroid injection as well as an antibiotic.  Patient reports that she got completely well after this treatment.  Her current symptoms include nasal congestion, rhinorrhea, postnasal drip, frequent clearing of her throat, as well as shortness of breath, coughing, and wheezing.  She also states that she has a headache frequently in the last 2 weeks at the top of her head.  She also reports watery eyes.  Her father has a history of hayfever as well as her brother.  She states that her brother got ALLERGY injections when he was a teenager.  Both her father and her brother have sleep apnea and on CPAP.  The only new environmental change is that she has gotten a new kitten (Bengal) approximately 3 weeks ago; her nasal symptoms started prior to obtaining this kitten but perhaps her wheezing occurred afterwards.    .  She's had cats in the past without, so she does not think this Is causing her issues.  Assessment:  1. New-onset Mild persistent asthma without complication: Symptomatic      2. Other chronic rhinitis  D. farinae IgE     D. pteronyssinus IgE     Dog dander IgE     ALLERGEN CAT EPITHELLIUM     IgE     Oak, white IgE   3. Hypothyroidism due to acquired atrophy of thyroid      4. Obesity, Class I, BMI 30-34.9     Plan:  1.  Patient is to start prednisone 20 mg 3 times a day for the next 6 days.  2.  Patient is to continue Flonase 2 sprays in each nostril twice a day  3.  Patient is to start either Singulair 10 mg every 24 hours or Advair 250/50 Diskus 1 puff inhaled twice a day depending on her cost for these medicines.  4.  Patient is to continue albuterol 2 puffs every 4-6 hours as needed.  5.  Patient is to  continue Zyrtec every 24 hours for now.  6.  If needed patient is to use Zatidor OTC ophthalmic drops 2 drops in each eye every 8-12 hours as needed or to prevent symptoms.  7.  I requested a revisit in approximately 2 weeks to assess her progress.  Also ordered lab today and patient is to check her portal for the results of these studies.      Review of Systems  Constitutional: Negative for changes in appetite, unintentional weight loss, fever, chills and fatigue.   HENT: Negative for facial pain, nose bleeds, nasal congestion, postnasal drip, throat clearing, sinus pressure, and voice change. Negative for ear discharge, ear pain, facial swelling, sore throat and trouble swallowing.  Eyes: Negative for occular discharge, redness, itching and visual disturbance.  Respiratory: Negative for chest tightness, shortness of breath, wheezing, dyspnea on exertion, sputum production and cough.   Cardiovascular: Negative for chest pain, palpatations and leg swelling.  Gastrointestinal: Negative for abdominal distension, abdominal pain, constipation, diarrhea, nausea,and vomiting.   Genitourinary: Negative for difficulty urinating.   Musculoskeletal: Negative for arthralgias, gait problems, joint swelling, myalgias and back pain.   Neurological: Negative for dizziness, syncope, weakness, light-headedness, and headaches.   Hematological: Negative for adenopathy, does not bruise or bleed easily.  Psychiatric/Behavioral: Negative for agitation, anxiety, behavioral problems, confusion, and insomnia.  Skin: Negative for rash.     PMH:  Reviewed with the patient and current for this visit    Family History:  Reviewed with the patient and current for this visit    Social History:  Reviewed with the patient and current for this visit     Environmental History:  Reviewed with the patient and current for this visit          Objective:      Skin Test results:  Patient has never been allergy prick skin tested but does have a positive  allergen specific IgE to both cat and dog allergen.    Immunotherapy:  Patient has never been on allergen specific immunotherapy.    Physical Exam  General:patient is well developed and well nourished, in no acute distress.  Patient is overweight  Mental Status:  Alert, oriented and cooperative  Head and Face: normocephalic   Allergic shiners: No  Eyes:   Pupils: ERRLA: Scleral conjunctiva: clear; Cornea: clear; Palprebal conjunctiva: normal: Eyelid Skin: normal  Ears:Tympanic membrane Left:intact and normal light reflex; Right:intact and normal light reflex; External canals normal bilaterally.  Nose:  Nares: patent; Mucosa :pink and moist; Nasal septum: midline;Turbinates are of normal size bilaterally and do not occlude the nasal airway.  Mouth/Pharynx: tonsils present; posterior pharyngeal wall normal; tongue normal; teeth normal; voice quality normal.  Neck:  Cervical lymph nodes: small, non-tender, freely moveable both anterior and posterior cervical chain; Trachea: midline; Masses: none  Lungs: Air movement is good; respiratory effort is good; no respiratory distress; breath sounds are vesicular in all lung fields; no wheezing; normal expiratory time; no cough.  Heart: regular rate and rhythm with mild respiratory variation; A1 and P2 are normal; no murmurs or gallops.  Abdomen:exam not done  Extremities: no cyanosis, clubbing or edema  Skin:no rashes or lesions present; skin hydrated and supple.    Skin Test Results: deferred on [facility]    Laboratory Results:  Component      Latest Ref Rng & Units 4/18/2018   D. farinae      <0.35 kU/L <0.35   D. farinae Class       CLASS 0   Mite Dust Pteronyssinus IgE      <0.35 kU/L <0.35   D. pteronyssinus Class       CLASS 0   Dog Dander, IgE      <0.35 kU/L 16.40 (H)   Dog Dander Class       CLASS III   Cat Dander      <0.35 kU/L 93.20 (H)   Cat Epithelium Class       CLASS V   White Oak(Quercus alba) IgE      <0.35 kU/L <0.35   [place], Class       CLASS 0   IgE      0 -  100 IU/mL 507 (H)           Assessment:       1. New-onset Mild persistent asthma without complication:  Quiescent on Singulair     2. Allergic rhinitis due to animal (cat) (dog) hair and dander:  Quiescent on medication     3. Other chronic allergic conjunctivitis of both eyes     4. Hypothyroidism due to acquired atrophy of thyroid     5. Obesity, Class I, BMI 30-34.9             Plan:         Return for re-visit: Follow-up if symptoms worsen or fail to improve.    Immunotherapy: No, but considered a later date    Lab or X-ray: No    Outside medical records requested: No        Patient Instructions   1.  Patient is to continue Singulair 10 mg every 24 hr.  2.  Patient is also to continue Flonase 2 sprays in each nostril every 24 hr  3.  Patient is to consider prick skin testing at a later date to panel of 60 separate aeroallergens reagents; patient understands she will have to stop her Zyrtec for 5 days prior to prick skin testing.  4.  Patient is to consider allergen specific immunotherapy at a later date.  5.  Patient understands I will retire in June of 2018 but will be available for medical care until the end of June.  After my [facility] patient understands she will continue medical care under the direction Dr. Natarajan at the [facility].      25 minutes spent face to face with this patient. 50% of time spent counseling this patient about the medical conditions (pathophysiology), the options and strategies for treatments, and the risks and benefits of treatments.    Patient education content included:  The benefits of allergen specific immunotherapy reviewed with the patient; also reviewed with the patient is the need for skin testing to the complete panel of aeroallergens to make sure that cat and dog allergen are the only allergens which are related to her symptoms.  Patient understands that she will have to stop her Zyrtec for at least 5 days prior to skin testing.  Also reviewed with the patient was  the importance of continuing both her Singulair and Flonase; long-term safety these medications was also reviewed.          Allegra Mojica MD

## 2018-05-09 NOTE — PATIENT INSTRUCTIONS
1.  Patient is to continue Singulair 10 mg every 24 hr.  2.  Patient is also to continue Flonase 2 sprays in each nostril every 24 hr  3.  Patient is to consider prick skin testing at a later date to panel of 60 separate aeroallergens reagents; patient understands she will have to stop her Zyrtec for 5 days prior to prick skin testing.  4.  Patient is to consider allergen specific immunotherapy at a later date.  5.  Patient understands I will retire in June of 2018 but will be available for medical care until the end of June.  After my [facility] patient understands she will continue medical care under the direction Dr. Natarajan at the [facility].

## 2018-11-08 DIAGNOSIS — E03.4 HYPOTHYROIDISM DUE TO ACQUIRED ATROPHY OF THYROID: ICD-10-CM

## 2018-11-08 RX ORDER — LEVOTHYROXINE SODIUM 112 UG/1
TABLET ORAL
Qty: 30 TABLET | Refills: 2 | Status: SHIPPED | OUTPATIENT
Start: 2018-11-08 | End: 2023-03-16

## 2019-03-20 ENCOUNTER — OFFICE VISIT (OUTPATIENT)
Dept: UROLOGY | Facility: CLINIC | Age: 44
End: 2019-03-20
Attending: UROLOGY
Payer: COMMERCIAL

## 2019-03-20 VITALS
SYSTOLIC BLOOD PRESSURE: 113 MMHG | HEIGHT: 65 IN | BODY MASS INDEX: 31.32 KG/M2 | HEART RATE: 82 BPM | WEIGHT: 188 LBS | DIASTOLIC BLOOD PRESSURE: 75 MMHG

## 2019-03-20 DIAGNOSIS — N39.46 MIXED INCONTINENCE URGE AND STRESS: ICD-10-CM

## 2019-03-20 DIAGNOSIS — N39.3 STRESS INCONTINENCE OF URINE: Primary | ICD-10-CM

## 2019-03-20 DIAGNOSIS — R31.29 MICROHEMATURIA: ICD-10-CM

## 2019-03-20 LAB — POC RESIDUAL URINE VOLUME: 11 ML (ref 0–100)

## 2019-03-20 PROCEDURE — 3008F BODY MASS INDEX DOCD: CPT | Mod: CPTII,S$GLB,, | Performed by: UROLOGY

## 2019-03-20 PROCEDURE — 3008F PR BODY MASS INDEX (BMI) DOCUMENTED: ICD-10-PCS | Mod: CPTII,S$GLB,, | Performed by: UROLOGY

## 2019-03-20 PROCEDURE — 51798 POCT BLADDER SCAN: ICD-10-PCS | Mod: S$GLB,,, | Performed by: UROLOGY

## 2019-03-20 PROCEDURE — 81002 URINALYSIS NONAUTO W/O SCOPE: CPT | Mod: S$GLB,,, | Performed by: UROLOGY

## 2019-03-20 PROCEDURE — 51798 US URINE CAPACITY MEASURE: CPT | Mod: S$GLB,,, | Performed by: UROLOGY

## 2019-03-20 PROCEDURE — 99204 OFFICE O/P NEW MOD 45 MIN: CPT | Mod: 25,S$GLB,, | Performed by: UROLOGY

## 2019-03-20 PROCEDURE — 81002 POCT URINE DIPSTICK WITHOUT MICROSCOPE: ICD-10-PCS | Mod: S$GLB,,, | Performed by: UROLOGY

## 2019-03-20 PROCEDURE — 99204 PR OFFICE/OUTPT VISIT, NEW, LEVL IV, 45-59 MIN: ICD-10-PCS | Mod: 25,S$GLB,, | Performed by: UROLOGY

## 2019-03-20 PROCEDURE — 81001 URINALYSIS AUTO W/SCOPE: CPT

## 2019-03-20 RX ORDER — METHYLPREDNISOLONE 4 MG/1
TABLET ORAL
COMMUNITY
Start: 2019-01-10 | End: 2019-03-20

## 2019-03-20 RX ORDER — HYDROCODONE BITARTRATE AND ACETAMINOPHEN 5; 325 MG/1; MG/1
TABLET ORAL
COMMUNITY
Start: 2019-01-10 | End: 2019-03-20

## 2019-03-20 RX ORDER — FLUCONAZOLE 150 MG/1
TABLET ORAL
COMMUNITY
Start: 2019-01-10 | End: 2019-04-24

## 2019-03-20 RX ORDER — IBUPROFEN 800 MG/1
TABLET ORAL
COMMUNITY
Start: 2019-01-10

## 2019-03-20 RX ORDER — AMOXICILLIN 500 MG/1
CAPSULE ORAL
COMMUNITY
Start: 2019-01-10 | End: 2019-03-20

## 2019-03-20 NOTE — PROGRESS NOTES
"  Subjective:       Zenaida Sung is a 43 y.o. female who is a new patient who was self-referred for evaluation of UI.      She reports UI - mainly JULIAN with laugh, cough, sneeze, running, lifting. Symptoms are worsening. JULIAN with moderate volume leakage, daily. She also reports increased urgency, UUI. She reports urinary frequency - voids estimated 4-6x daily. Nocturia x 1-3. Admits to drinking a lot of water. No prior attempts at treatment. Reports she was told about Kegels exercises when pregnant but "did not work for her." One . Not wearing pads.     Occasional UTI. Yeast infection always with abx. Denies hematuria, nephrolithiasis. Nocturnal enuresis until age 11.     She reports her father  of bladder cancer. She is a nonsmoker.     PVR (bladder scan) today - [facility]      The following portions of the patient's history were reviewed and updated as appropriate: allergies, current medications, past family history, past medical history, past social history, past surgical history and problem list.    Review of Systems  Constitutional: no fever or chills  ENT: no nasal congestion or sore throat  Respiratory: no cough or shortness of breath  Cardiovascular: no chest pain or palpitations  Gastrointestinal: no nausea or vomiting, tolerating diet  Genitourinary: as per HPI  Hematologic/Lymphatic: no easy bruising or lymphadenopathy  Musculoskeletal: no arthralgias or myalgias  Skin: no rashes or lesions  Neurological: no seizures or tremors  Behavioral/Psych: no auditory or visual hallucinations        Objective:    Vitals: /75 (BP Location: Left arm, Patient Position: Sitting, BP Method: Large (Automatic))   Pulse 82   Ht 5' 5" (1.651 m)   Wt 85.3 kg (188 lb)   BMI 31.28 kg/m²     Physical Exam   General: well developed, well nourished in no acute distress  Head: normocephalic, atraumatic  Neck: supple, trachea midline, no obvious enlargement of thyroid  HEENT: EOMI, mucus membranes moist, " sclera anicteric, no hearing impairment  Lungs: symmetric expansion, non-labored breathing  Cardiovascular: regular rate and rhythm, normal pulses  Abdomen: soft, non tender, non distended, no palpable masses, no hepatosplenomegaly, no hernias, no CVA tenderness  Musculoskeletal: no peripheral edema, normal ROM in bilateral upper and lower extremities  Lymphatics: no cervical or inguinal lymphadenopathy  Skin: no rashes or lesions  Neuro: alert and oriented x 3, no gross deficits  Psych: normal judgment and insight, normal mood/affect and non-anxious  Genitourinary:   patient declined exam      Lab Review   Urine analysis today in clinic shows positive for red blood cells 5-10    No results found for: WBC, HGB, HCT, MCV, PLT  No results found for: CREATININE, BUN    Imaging  NA       Assessment/Plan:      1. Stress incontinence of urine    - JULIAN: Kegels, PFPT, pessary, bulking agent, MUS.   - Most bothersome   - Interested in MUS. Discussed in detail today. Handout given.   - Will perform cysto with PE and leak testing. Discussed UDS, will hold.     2. Mixed incontinence urge and stress    - Discussed difference of UUI and JULIAN components. Reviewed etiology and workup of each.   - JULIAN: Kegels, PFPT, pessary, bulking agent, MUS.   - UUI: Behavioral changes, PFPT, anticholinergics, mirabegron. Botox/InterStim for refractory UUI.   - JULIAN per above   - UUI less bothersome but worsening. Trial Oxytrol patch OTC.      3. Microhematuria   - Father  bladder cancer   - 5-10 RBCs on UA today. Discussed false positive.   - UA micro      Follow up in 1-3 weeks for cysto

## 2019-03-21 LAB
BACTERIA #/AREA URNS AUTO: NORMAL /HPF
BILIRUB SERPL-MCNC: ABNORMAL MG/DL
BLOOD URINE, POC: ABNORMAL
COLOR, POC UA: YELLOW
GLUCOSE UR QL STRIP: ABNORMAL
KETONES UR QL STRIP: ABNORMAL
LEUKOCYTE ESTERASE URINE, POC: ABNORMAL
MICROSCOPIC COMMENT: NORMAL
NITRITE, POC UA: ABNORMAL
PH, POC UA: 8
PROTEIN, POC: ABNORMAL
RBC #/AREA URNS AUTO: 0 /HPF (ref 0–4)
SPECIFIC GRAVITY, POC UA: 1
SQUAMOUS #/AREA URNS AUTO: 0 /HPF
UROBILINOGEN, POC UA: ABNORMAL
WBC #/AREA URNS AUTO: 0 /HPF (ref 0–5)

## 2019-03-22 ENCOUNTER — PATIENT MESSAGE (OUTPATIENT)
Dept: UROLOGY | Facility: CLINIC | Age: 44
End: 2019-03-22

## 2019-04-24 ENCOUNTER — PROCEDURE VISIT (OUTPATIENT)
Dept: UROLOGY | Facility: CLINIC | Age: 44
End: 2019-04-24
Attending: UROLOGY
Payer: COMMERCIAL

## 2019-04-24 VITALS
BODY MASS INDEX: 31.32 KG/M2 | HEIGHT: 65 IN | SYSTOLIC BLOOD PRESSURE: 106 MMHG | WEIGHT: 188 LBS | DIASTOLIC BLOOD PRESSURE: 71 MMHG | HEART RATE: 69 BPM

## 2019-04-24 DIAGNOSIS — N39.3 STRESS INCONTINENCE OF URINE: Primary | ICD-10-CM

## 2019-04-24 PROCEDURE — 52000 CYSTOURETHROSCOPY: CPT | Mod: S$GLB,,, | Performed by: UROLOGY

## 2019-04-24 PROCEDURE — 52000 CYSTOSCOPY: ICD-10-PCS | Mod: S$GLB,,, | Performed by: UROLOGY

## 2019-04-24 RX ORDER — CIPROFLOXACIN 500 MG/1
500 TABLET ORAL EVERY 12 HOURS
Status: SHIPPED | OUTPATIENT
Start: 2019-04-24

## 2019-04-24 RX ORDER — LIDOCAINE HYDROCHLORIDE 20 MG/ML
JELLY TOPICAL ONCE
Status: COMPLETED | OUTPATIENT
Start: 2019-04-24 | End: 2019-04-24

## 2019-04-24 RX ADMIN — LIDOCAINE HYDROCHLORIDE 5 ML: 20 JELLY TOPICAL at 11:04

## 2019-04-24 RX ADMIN — CIPROFLOXACIN 500 MG: 500 TABLET ORAL at 11:04

## 2019-04-24 NOTE — PROCEDURES
"Cystoscopy  Date/Time: 4/24/2019 11:01 AM  Performed by: Danielle Enriquez MD  Authorized by: Danielle Enriquez MD     Consent Done?:  Yes (Written)  Time out: Immediately prior to procedure a "time out" was called to verify the correct patient, procedure, equipment, support staff and site/side marked as required.    Indications: incontinence    Position:  Dorsal lithotomy  Anesthesia:  Lidocaine jelly  Patient sedated?: No    Preparation: Patient was prepped and draped in usual sterile fashion      Scope type:  Flexible cystoscope  Stent inserted: No    Stent removed: No    External exam normal: Yes    Digital exam performed: Yes    Urethra normal: Yes  Bladder neck normal: Bladder neck normal   Bladder normal: Yes      Patient tolerance:  Patient tolerated the procedure well with no immediate complications     Normal PE. No leak supine cough test. ++leak with standing cough test.      - Will proceed with Solyx single incision MUS 5/15/19   - Discussed etiology and treatment of JULIAN today including observation, PFPT, Kegels, urethral bulking agents, and MUS (synthetic and autologous). I showed her the abnormalities in pelvic floor anatomy that contributes to JULIAN and how a MUS fixes this. We thoroughly discussed the FDA statement on pelvic mesh, thought MUS is not technically involved in this statement. We discussed how a sling is placed and expected post-op course. She understands risk of mesh erosion/extrusion, dyspareunia, urinary retention, dysuria, pelvic pain, and need for repeat procedures.         "

## 2019-04-24 NOTE — H&P
"  Subjective:       Zenaida Sung is a 43 y.o. female who is a new patient who was self-referred for evaluation of UI.      She reports UI - mainly JULIAN with laugh, cough, sneeze, running, lifting. Symptoms are worsening. JULIAN with moderate volume leakage, daily. She also reports increased urgency, UUI. She reports urinary frequency - voids estimated 4-6x daily. Nocturia x 1-3. Admits to drinking a lot of water. No prior attempts at treatment. Reports she was told about Kegels exercises when pregnant but "did not work for her." One . Not wearing pads.     Occasional UTI. Yeast infection always with abx. Denies hematuria, nephrolithiasis. Nocturnal enuresis until age 11.     She reports her father  of bladder cancer. She is a nonsmoker.     PVR (bladder scan) today - [facility]      The following portions of the patient's history were reviewed and updated as appropriate: allergies, current medications, past family history, past medical history, past social history, past surgical history and problem list.    Review of Systems  Constitutional: no fever or chills  ENT: no nasal congestion or sore throat  Respiratory: no cough or shortness of breath  Cardiovascular: no chest pain or palpitations  Gastrointestinal: no nausea or vomiting, tolerating diet  Genitourinary: as per HPI  Hematologic/Lymphatic: no easy bruising or lymphadenopathy  Musculoskeletal: no arthralgias or myalgias  Skin: no rashes or lesions  Neurological: no seizures or tremors  Behavioral/Psych: no auditory or visual hallucinations        Objective:    Vitals: /71 (BP Location: Right arm, Patient Position: Sitting, BP Method: Large (Automatic))   Pulse 69   Ht 5' 5" (1.651 m)   Wt 85.3 kg (188 lb)   BMI 31.28 kg/m²     Physical Exam   General: well developed, well nourished in no acute distress  Head: normocephalic, atraumatic  Neck: supple, trachea midline, no obvious enlargement of thyroid  HEENT: EOMI, mucus membranes moist, " "sclera anicteric, no hearing impairment  Lungs: symmetric expansion, non-labored breathing  Cardiovascular: regular rate and rhythm, normal pulses  Abdomen: soft, non tender, non distended, no palpable masses, no hepatosplenomegaly, no hernias, no CVA tenderness  Musculoskeletal: no peripheral edema, normal ROM in bilateral upper and lower extremities  Lymphatics: no cervical or inguinal lymphadenopathy  Skin: no rashes or lesions  Neuro: alert and oriented x 3, no gross deficits  Psych: normal judgment and insight, normal mood/affect and non-anxious  Genitourinary:   patient declined exam      Lab Review   Urine analysis today in clinic shows positive for red blood cells 5-10    No results found for: WBC, HGB, HCT, MCV, PLT  No results found for: CREATININE, BUN    Imaging  NA       Assessment/Plan:      1. Stress incontinence of urine    - JULIAN: Kegels, PFPT, pessary, bulking agent, MUS.   - Most bothersome   - Interested in MUS. Discussed in detail today. Handout given.   - Will perform cysto with PE and leak testing. Discussed UDS, will hold.     2. Mixed incontinence urge and stress    - Discussed difference of UUI and JULIAN components. Reviewed etiology and workup of each.   - JULIAN: Kegels, PFPT, pessary, bulking agent, MUS.   - UUI: Behavioral changes, PFPT, anticholinergics, mirabegron. Botox/InterStim for refractory UUI.   - JULIAN per above   - UUI less bothersome but worsening. Trial Oxytrol patch OTC.      3. Microhematuria   - Father  bladder cancer   - 5-10 RBCs on UA today. Discussed false positive.   - UA micro      Follow up in 1-3 weeks for cysto              Cystoscopy  Date/Time: 2019 11:01 AM  Performed by: Danielle Enriquez MD  Authorized by: Danielle Enriquez MD      Consent Done?:  Yes (Written)  Time out: Immediately prior to procedure a "time out" was called to verify the correct patient, procedure, equipment, support staff and site/side marked as required.    Indications: " incontinence    Position:  Dorsal lithotomy  Anesthesia:  Lidocaine jelly  Patient sedated?: No    Preparation: Patient was prepped and draped in usual sterile fashion       Scope type:  Flexible cystoscope  Stent inserted: No    Stent removed: No    External exam normal: Yes    Digital exam performed: Yes    Urethra normal: Yes  Bladder neck normal: Bladder neck normal   Bladder normal: Yes       Patient tolerance:  Patient tolerated the procedure well with no immediate complications      Normal PE. No leak supine cough test. ++leak with standing cough test.       - Will proceed with Solyx single incision MUS 5/15/19   - Discussed etiology and treatment of JULIAN today including observation, PFPT, Kegels, urethral bulking agents, and MUS (synthetic and autologous). I showed her the abnormalities in pelvic floor anatomy that contributes to JULIAN and how a MUS fixes this. We thoroughly discussed the FDA statement on pelvic mesh, thought MUS is not technically involved in this statement. We discussed how a sling is placed and expected post-op course. She understands risk of mesh erosion/extrusion, dyspareunia, urinary retention, dysuria, pelvic pain, and need for repeat procedures.

## 2019-04-24 NOTE — H&P (VIEW-ONLY)
"  Subjective:       Zenaida Sung is a 43 y.o. female who is a new patient who was self-referred for evaluation of UI.      She reports UI - mainly JULIAN with laugh, cough, sneeze, running, lifting. Symptoms are worsening. JULIAN with moderate volume leakage, daily. She also reports increased urgency, UUI. She reports urinary frequency - voids estimated 4-6x daily. Nocturia x 1-3. Admits to drinking a lot of water. No prior attempts at treatment. Reports she was told about Kegels exercises when pregnant but "did not work for her." One . Not wearing pads.     Occasional UTI. Yeast infection always with abx. Denies hematuria, nephrolithiasis. Nocturnal enuresis until age 11.     She reports her father  of bladder cancer. She is a nonsmoker.     PVR (bladder scan) today - [facility]      The following portions of the patient's history were reviewed and updated as appropriate: allergies, current medications, past family history, past medical history, past social history, past surgical history and problem list.    Review of Systems  Constitutional: no fever or chills  ENT: no nasal congestion or sore throat  Respiratory: no cough or shortness of breath  Cardiovascular: no chest pain or palpitations  Gastrointestinal: no nausea or vomiting, tolerating diet  Genitourinary: as per HPI  Hematologic/Lymphatic: no easy bruising or lymphadenopathy  Musculoskeletal: no arthralgias or myalgias  Skin: no rashes or lesions  Neurological: no seizures or tremors  Behavioral/Psych: no auditory or visual hallucinations        Objective:    Vitals: /71 (BP Location: Right arm, Patient Position: Sitting, BP Method: Large (Automatic))   Pulse 69   Ht 5' 5" (1.651 m)   Wt 85.3 kg (188 lb)   BMI 31.28 kg/m²     Physical Exam   General: well developed, well nourished in no acute distress  Head: normocephalic, atraumatic  Neck: supple, trachea midline, no obvious enlargement of thyroid  HEENT: EOMI, mucus membranes moist, " "sclera anicteric, no hearing impairment  Lungs: symmetric expansion, non-labored breathing  Cardiovascular: regular rate and rhythm, normal pulses  Abdomen: soft, non tender, non distended, no palpable masses, no hepatosplenomegaly, no hernias, no CVA tenderness  Musculoskeletal: no peripheral edema, normal ROM in bilateral upper and lower extremities  Lymphatics: no cervical or inguinal lymphadenopathy  Skin: no rashes or lesions  Neuro: alert and oriented x 3, no gross deficits  Psych: normal judgment and insight, normal mood/affect and non-anxious  Genitourinary:   patient declined exam      Lab Review   Urine analysis today in clinic shows positive for red blood cells 5-10    No results found for: WBC, HGB, HCT, MCV, PLT  No results found for: CREATININE, BUN    Imaging  NA       Assessment/Plan:      1. Stress incontinence of urine    - JULIAN: Kegels, PFPT, pessary, bulking agent, MUS.   - Most bothersome   - Interested in MUS. Discussed in detail today. Handout given.   - Will perform cysto with PE and leak testing. Discussed UDS, will hold.     2. Mixed incontinence urge and stress    - Discussed difference of UUI and JULIAN components. Reviewed etiology and workup of each.   - JULIAN: Kegels, PFPT, pessary, bulking agent, MUS.   - UUI: Behavioral changes, PFPT, anticholinergics, mirabegron. Botox/InterStim for refractory UUI.   - JULIAN per above   - UUI less bothersome but worsening. Trial Oxytrol patch OTC.      3. Microhematuria   - Father  bladder cancer   - 5-10 RBCs on UA today. Discussed false positive.   - UA micro      Follow up in 1-3 weeks for cysto              Cystoscopy  Date/Time: 2019 11:01 AM  Performed by: Danielle Enriquez MD  Authorized by: Danielle Enriquez MD      Consent Done?:  Yes (Written)  Time out: Immediately prior to procedure a "time out" was called to verify the correct patient, procedure, equipment, support staff and site/side marked as required.    Indications: " incontinence    Position:  Dorsal lithotomy  Anesthesia:  Lidocaine jelly  Patient sedated?: No    Preparation: Patient was prepped and draped in usual sterile fashion       Scope type:  Flexible cystoscope  Stent inserted: No    Stent removed: No    External exam normal: Yes    Digital exam performed: Yes    Urethra normal: Yes  Bladder neck normal: Bladder neck normal   Bladder normal: Yes       Patient tolerance:  Patient tolerated the procedure well with no immediate complications      Normal PE. No leak supine cough test. ++leak with standing cough test.       - Will proceed with Solyx single incision MUS 5/15/19   - Discussed etiology and treatment of JULIAN today including observation, PFPT, Kegels, urethral bulking agents, and MUS (synthetic and autologous). I showed her the abnormalities in pelvic floor anatomy that contributes to JULIAN and how a MUS fixes this. We thoroughly discussed the FDA statement on pelvic mesh, thought MUS is not technically involved in this statement. We discussed how a sling is placed and expected post-op course. She understands risk of mesh erosion/extrusion, dyspareunia, urinary retention, dysuria, pelvic pain, and need for repeat procedures.

## 2019-05-06 ENCOUNTER — ANESTHESIA EVENT (OUTPATIENT)
Dept: SURGERY | Facility: OTHER | Age: 44
End: 2019-05-06
Payer: COMMERCIAL

## 2019-05-06 ENCOUNTER — HOSPITAL ENCOUNTER (OUTPATIENT)
Dept: PREADMISSION TESTING | Facility: OTHER | Age: 44
Discharge: HOME | End: 2019-05-06
Attending: UROLOGY
Payer: COMMERCIAL

## 2019-05-06 VITALS
RESPIRATION RATE: 16 BRPM | HEART RATE: 65 BPM | DIASTOLIC BLOOD PRESSURE: 62 MMHG | SYSTOLIC BLOOD PRESSURE: 100 MMHG | HEIGHT: 65 IN | BODY MASS INDEX: 29.99 KG/M2 | TEMPERATURE: 99 F | OXYGEN SATURATION: 98 % | WEIGHT: 180 LBS

## 2019-05-06 RX ORDER — PREGABALIN 75 MG/1
150 CAPSULE ORAL ONCE
Status: CANCELLED | OUTPATIENT
Start: 2019-05-06 | End: 2019-05-06

## 2019-05-06 RX ORDER — SCOLOPAMINE TRANSDERMAL SYSTEM 1 MG/1
1 PATCH, EXTENDED RELEASE TRANSDERMAL ONCE
Status: CANCELLED | OUTPATIENT
Start: 2019-05-06 | End: 2019-05-06

## 2019-05-06 RX ORDER — FAMOTIDINE 20 MG/1
20 TABLET, FILM COATED ORAL
Status: CANCELLED | OUTPATIENT
Start: 2019-05-06 | End: 2019-05-06

## 2019-05-06 RX ORDER — ACETAMINOPHEN 500 MG
1000 TABLET ORAL
Status: CANCELLED | OUTPATIENT
Start: 2019-05-06 | End: 2019-05-06

## 2019-05-06 RX ORDER — SODIUM CHLORIDE, SODIUM LACTATE, POTASSIUM CHLORIDE, CALCIUM CHLORIDE 600; 310; 30; 20 MG/100ML; MG/100ML; MG/100ML; MG/100ML
INJECTION, SOLUTION INTRAVENOUS CONTINUOUS
Status: CANCELLED | OUTPATIENT
Start: 2019-05-06

## 2019-05-06 RX ORDER — LIDOCAINE HYDROCHLORIDE 10 MG/ML
0.5 INJECTION, SOLUTION EPIDURAL; INFILTRATION; INTRACAUDAL; PERINEURAL ONCE
Status: CANCELLED | OUTPATIENT
Start: 2019-05-06 | End: 2019-05-06

## 2019-05-06 NOTE — ANESTHESIA PREPROCEDURE EVALUATION
05/06/2019  Zenaida Sung is a 43 y.o., female.    Anesthesia Evaluation    I have reviewed the Patient Summary Reports.    I have reviewed the Nursing Notes.   I have reviewed the Medications.     Review of Systems  Anesthesia Hx:  Denies Family Hx of Anesthesia complications.  Personal Hx of Anesthesia complications, Post-Operative Nausea/Vomiting   Social:  Non-Smoker    Hematology/Oncology:  Hematology Normal   Oncology Normal     EENT/Dental:   chronic allergic rhinitis   Cardiovascular:  Cardiovascular Normal Exercise tolerance: good     Pulmonary:   Asthma (very rare rescue inhaler use, controled with singulair)    Renal/:  Renal/ Normal     Hepatic/GI:  Hepatic/GI Normal    Musculoskeletal:  Musculoskeletal Normal    Neurological:  Neurology Normal    Endocrine:   Hypothyroidism    Dermatological:  Skin Normal    Psych:  Psychiatric Normal           Physical Exam  General:  Obesity    Airway/Jaw/Neck:  Airway Findings: Mouth Opening: Normal Mallampati: I  TM Distance: Normal, at least 6 cm  Jaw/Neck Findings:  Neck ROM: Normal ROM      Dental:  Dental Findings: In tact        Mental Status:  Mental Status Findings:  Cooperative, Alert and Oriented         Anesthesia Plan  Type of Anesthesia, risks & benefits discussed:  Anesthesia Type:  general  Patient's Preference:   Intra-op Monitoring Plan: standard ASA monitors  Intra-op Monitoring Plan Comments:   Post Op Pain Control Plan: multimodal analgesia  Post Op Pain Control Plan Comments:   Induction:   IV  Beta Blocker:         Informed Consent: Patient understands risks and agrees with Anesthesia plan.  Questions answered. Anesthesia consent signed with patient.  ASA Score: 2     Day of Surgery Review of History & Physical:    H&P update referred to the surgeon.     Anesthesia Plan Notes: No labs        Ready For Surgery From Anesthesia  Perspective.

## 2019-05-06 NOTE — DISCHARGE INSTRUCTIONS
PRE-ADMIT TESTING -  [phone]    [address]  [place] [facility]          Your surgery has been scheduled at [facility]. We are pleased to have the opportunity to serve you. For Further Information please call [phone].    On the day of surgery please report to the Information Desk on the 1st floor.    · CONTACT YOUR PHYSICIAN'S OFFICE THE DAY PRIOR TO YOUR SURGERY TO OBTAIN YOUR ARRIVAL TIME.     · The evening before surgery do not eat anything after 9 p.m. ( this includes hard candy, chewing gum and mints).  You may only have GATORADE, POWERADE AND WATER  from 9 p.m. until you leave your home.   DO NOT DRINK ANY LIQUIDS ON THE WAY TO THE HOSPITAL.      SPECIAL MEDICATION INSTRUCTIONS: TAKE medications checked off by the Anesthesiologist on your Medication List.    Angiogram Patients: Take medications as instructed by your physician, including aspirin.     Surgery Patients:    If you take ASPIRIN - Your PHYSICIAN/SURGEON will need to inform you IF/OR when you need to stop taking aspirin prior to your surgery.     Do Not take any medications containing IBUPROFEN.  Do Not Wear any make-up or dark nail polish   (especially eye make-up) to surgery. If you come to surgery with makeup on you will be required to remove the makeup or nail polish.    Do not shave your surgical area at least 5 days prior to your surgery. The surgical prep will be performed at the hospital according to Infection Control regulations.    Leave all valuables at home.   Do Not wear any jewelry or watches, including any metal in body piercings. Jewelry must be removed prior to coming to the hospital.  There is a possibility that rings that are unable to be removed may be cut off if they are on the surgical extremity.    Contact Lens must be removed before surgery. Either do not wear the contact lens or bring a case and solution for storage.  Please bring a container for eyeglasses or dentures as required.  Bring  any paperwork your physician has provided, such as consent forms,  history and physicals, doctor's orders, etc.   Bring comfortable clothes that are loose fitting to wear upon discharge. Take into consideration the type of surgery being performed.  Maintain your diet as advised per your physician the day prior to surgery.      Adequate rest the night before surgery is advised.   Park in the Parking lot behind the hospital or in the [facility] across the street from the parking lot. Parking is complimentary.  If you will be discharged the same day as your procedure, please arrange for a responsible adult to drive you home or to accompany you if traveling by taxi.   YOU WILL NOT BE PERMITTED TO DRIVE OR TO LEAVE THE HOSPITAL ALONE AFTER SURGERY.   It is strongly recommended that you arrange for someone to remain with you for the first 24 hrs following your surgery.       Thank you for your cooperation.  The Staff of [facility].                Bathing Instructions with Hibiclens     Shower the evening before and morning of your procedure with Hibiclens:   Wash your face with water and your regular face wash/soap   Apply Hibiclens directly on your skin or on a wet washcloth and wash gently. When showering: Move away from the shower stream when applying Hibiclens to avoid rinsing off too soon.   Rinse thoroughly with warm water   Do not dilute Hibiclens         Dry off as usual, do not use any deodorant, powder, body lotions, perfume, after shave or cologne.

## 2019-05-15 ENCOUNTER — ANESTHESIA (OUTPATIENT)
Dept: SURGERY | Facility: OTHER | Age: 44
End: 2019-05-15
Payer: COMMERCIAL

## 2019-05-15 ENCOUNTER — HOSPITAL ENCOUNTER (OUTPATIENT)
Facility: OTHER | Age: 44
Discharge: HOME | End: 2019-05-15
Attending: UROLOGY | Admitting: UROLOGY
Payer: COMMERCIAL

## 2019-05-15 VITALS
HEART RATE: 64 BPM | DIASTOLIC BLOOD PRESSURE: 68 MMHG | TEMPERATURE: 98 F | SYSTOLIC BLOOD PRESSURE: 112 MMHG | HEIGHT: 65 IN | RESPIRATION RATE: 16 BRPM | OXYGEN SATURATION: 100 % | BODY MASS INDEX: 29.99 KG/M2 | WEIGHT: 180 LBS

## 2019-05-15 DIAGNOSIS — N39.3 STRESS INCONTINENCE OF URINE: ICD-10-CM

## 2019-05-15 LAB
B-HCG UR QL: NEGATIVE
CTP QC/QA: YES

## 2019-05-15 PROCEDURE — 25000003 PHARM REV CODE 250: Performed by: ANESTHESIOLOGY

## 2019-05-15 PROCEDURE — 36000707: Performed by: UROLOGY

## 2019-05-15 PROCEDURE — 25000003 PHARM REV CODE 250: Performed by: NURSE ANESTHETIST, CERTIFIED REGISTERED

## 2019-05-15 PROCEDURE — 37000009 HC ANESTHESIA EA ADD 15 MINS: Performed by: UROLOGY

## 2019-05-15 PROCEDURE — 57288 REPAIR BLADDER DEFECT: CPT | Mod: ,,, | Performed by: UROLOGY

## 2019-05-15 PROCEDURE — 63600175 PHARM REV CODE 636 W HCPCS: Performed by: NURSE ANESTHETIST, CERTIFIED REGISTERED

## 2019-05-15 PROCEDURE — 63600175 PHARM REV CODE 636 W HCPCS: Performed by: UROLOGY

## 2019-05-15 PROCEDURE — 36000706: Performed by: UROLOGY

## 2019-05-15 PROCEDURE — 57288 PR SLING OPER STRES INCONTINENCE: ICD-10-PCS | Mod: ,,, | Performed by: UROLOGY

## 2019-05-15 PROCEDURE — C1771 REP DEV, URINARY, W/SLING: HCPCS | Performed by: UROLOGY

## 2019-05-15 PROCEDURE — 71000016 HC POSTOP RECOV ADDL HR: Performed by: UROLOGY

## 2019-05-15 PROCEDURE — 25000003 PHARM REV CODE 250: Performed by: UROLOGY

## 2019-05-15 PROCEDURE — 71000033 HC RECOVERY, INTIAL HOUR: Performed by: UROLOGY

## 2019-05-15 PROCEDURE — 81025 URINE PREGNANCY TEST: CPT | Performed by: ANESTHESIOLOGY

## 2019-05-15 PROCEDURE — 71000015 HC POSTOP RECOV 1ST HR: Performed by: UROLOGY

## 2019-05-15 PROCEDURE — 37000008 HC ANESTHESIA 1ST 15 MINUTES: Performed by: UROLOGY

## 2019-05-15 DEVICE — SLING MIDURTHRL MESH SOLYX SIS: Type: IMPLANTABLE DEVICE | Site: URETHRA | Status: FUNCTIONAL

## 2019-05-15 RX ORDER — OXYCODONE HYDROCHLORIDE 5 MG/1
5 TABLET ORAL
Status: DISCONTINUED | OUTPATIENT
Start: 2019-05-15 | End: 2019-05-15 | Stop reason: HOSPADM

## 2019-05-15 RX ORDER — PHENAZOPYRIDINE HYDROCHLORIDE 200 MG/1
200 TABLET, FILM COATED ORAL 3 TIMES DAILY PRN
Status: DISCONTINUED | OUTPATIENT
Start: 2019-05-15 | End: 2019-05-15 | Stop reason: HOSPADM

## 2019-05-15 RX ORDER — HYDROMORPHONE HYDROCHLORIDE 2 MG/ML
0.4 INJECTION, SOLUTION INTRAMUSCULAR; INTRAVENOUS; SUBCUTANEOUS EVERY 5 MIN PRN
Status: DISCONTINUED | OUTPATIENT
Start: 2019-05-15 | End: 2019-05-15 | Stop reason: HOSPADM

## 2019-05-15 RX ORDER — BACITRACIN 50000 [IU]/1
INJECTION, POWDER, FOR SOLUTION INTRAMUSCULAR
Status: DISCONTINUED | OUTPATIENT
Start: 2019-05-15 | End: 2019-05-15 | Stop reason: HOSPADM

## 2019-05-15 RX ORDER — MIDAZOLAM HYDROCHLORIDE 1 MG/ML
INJECTION INTRAMUSCULAR; INTRAVENOUS
Status: DISCONTINUED | OUTPATIENT
Start: 2019-05-15 | End: 2019-05-15

## 2019-05-15 RX ORDER — ONDANSETRON 2 MG/ML
4 INJECTION INTRAMUSCULAR; INTRAVENOUS DAILY PRN
Status: DISCONTINUED | OUTPATIENT
Start: 2019-05-15 | End: 2019-05-15 | Stop reason: HOSPADM

## 2019-05-15 RX ORDER — SODIUM CHLORIDE 0.9 % (FLUSH) 0.9 %
3 SYRINGE (ML) INJECTION
Status: DISCONTINUED | OUTPATIENT
Start: 2019-05-15 | End: 2019-05-15 | Stop reason: HOSPADM

## 2019-05-15 RX ORDER — PHENAZOPYRIDINE HYDROCHLORIDE 100 MG/1
200 TABLET, FILM COATED ORAL 3 TIMES DAILY PRN
Qty: 18 TABLET | Refills: 0 | Status: SHIPPED | OUTPATIENT
Start: 2019-05-15 | End: 2019-05-15 | Stop reason: SDUPTHER

## 2019-05-15 RX ORDER — PHENAZOPYRIDINE HYDROCHLORIDE 100 MG/1
200 TABLET, FILM COATED ORAL 3 TIMES DAILY PRN
Qty: 18 TABLET | Refills: 0 | Status: SHIPPED | OUTPATIENT
Start: 2019-05-15 | End: 2019-05-29

## 2019-05-15 RX ORDER — FENTANYL CITRATE 50 UG/ML
INJECTION, SOLUTION INTRAMUSCULAR; INTRAVENOUS
Status: DISCONTINUED | OUTPATIENT
Start: 2019-05-15 | End: 2019-05-15

## 2019-05-15 RX ORDER — SODIUM CHLORIDE, SODIUM LACTATE, POTASSIUM CHLORIDE, CALCIUM CHLORIDE 600; 310; 30; 20 MG/100ML; MG/100ML; MG/100ML; MG/100ML
INJECTION, SOLUTION INTRAVENOUS CONTINUOUS
Status: DISCONTINUED | OUTPATIENT
Start: 2019-05-15 | End: 2019-05-15 | Stop reason: HOSPADM

## 2019-05-15 RX ORDER — LIDOCAINE HCL/PF 100 MG/5ML
SYRINGE (ML) INTRAVENOUS
Status: DISCONTINUED | OUTPATIENT
Start: 2019-05-15 | End: 2019-05-15

## 2019-05-15 RX ORDER — DEXAMETHASONE SODIUM PHOSPHATE 4 MG/ML
INJECTION, SOLUTION INTRA-ARTICULAR; INTRALESIONAL; INTRAMUSCULAR; INTRAVENOUS; SOFT TISSUE
Status: DISCONTINUED | OUTPATIENT
Start: 2019-05-15 | End: 2019-05-15

## 2019-05-15 RX ORDER — GLYCOPYRROLATE 0.2 MG/ML
INJECTION INTRAMUSCULAR; INTRAVENOUS
Status: DISCONTINUED | OUTPATIENT
Start: 2019-05-15 | End: 2019-05-15

## 2019-05-15 RX ORDER — ACETAMINOPHEN 325 MG/1
650 TABLET ORAL EVERY 4 HOURS PRN
Status: DISCONTINUED | OUTPATIENT
Start: 2019-05-15 | End: 2019-05-15 | Stop reason: HOSPADM

## 2019-05-15 RX ORDER — PHENYLEPHRINE HYDROCHLORIDE 10 MG/ML
INJECTION INTRAVENOUS
Status: DISCONTINUED | OUTPATIENT
Start: 2019-05-15 | End: 2019-05-15

## 2019-05-15 RX ORDER — HYDROCODONE BITARTRATE AND ACETAMINOPHEN 5; 325 MG/1; MG/1
1 TABLET ORAL EVERY 4 HOURS PRN
Status: DISCONTINUED | OUTPATIENT
Start: 2019-05-15 | End: 2019-05-15 | Stop reason: HOSPADM

## 2019-05-15 RX ORDER — SODIUM CHLORIDE, SODIUM LACTATE, POTASSIUM CHLORIDE, CALCIUM CHLORIDE 600; 310; 30; 20 MG/100ML; MG/100ML; MG/100ML; MG/100ML
INJECTION, SOLUTION INTRAVENOUS CONTINUOUS
Status: CANCELLED | OUTPATIENT
Start: 2019-05-15

## 2019-05-15 RX ORDER — HYDROCODONE BITARTRATE AND ACETAMINOPHEN 5; 325 MG/1; MG/1
1 TABLET ORAL EVERY 6 HOURS PRN
Qty: 10 TABLET | Refills: 0 | Status: SHIPPED | OUTPATIENT
Start: 2019-05-15 | End: 2019-05-29

## 2019-05-15 RX ORDER — KETOROLAC TROMETHAMINE 30 MG/ML
INJECTION, SOLUTION INTRAMUSCULAR; INTRAVENOUS
Status: DISCONTINUED | OUTPATIENT
Start: 2019-05-15 | End: 2019-05-15

## 2019-05-15 RX ORDER — PREGABALIN 75 MG/1
150 CAPSULE ORAL ONCE
Status: COMPLETED | OUTPATIENT
Start: 2019-05-15 | End: 2019-05-15

## 2019-05-15 RX ORDER — SCOLOPAMINE TRANSDERMAL SYSTEM 1 MG/1
1 PATCH, EXTENDED RELEASE TRANSDERMAL ONCE
Status: COMPLETED | OUTPATIENT
Start: 2019-05-15 | End: 2019-05-15

## 2019-05-15 RX ORDER — HYDROCODONE BITARTRATE AND ACETAMINOPHEN 5; 325 MG/1; MG/1
1 TABLET ORAL EVERY 6 HOURS PRN
Qty: 10 TABLET | Refills: 0 | Status: SHIPPED | OUTPATIENT
Start: 2019-05-15 | End: 2019-05-15 | Stop reason: SDUPTHER

## 2019-05-15 RX ORDER — CEPHALEXIN 500 MG/1
500 CAPSULE ORAL EVERY 12 HOURS
Qty: 4 CAPSULE | Refills: 0 | Status: SHIPPED | OUTPATIENT
Start: 2019-05-15 | End: 2019-05-15 | Stop reason: SDUPTHER

## 2019-05-15 RX ORDER — MEPERIDINE HYDROCHLORIDE 25 MG/ML
12.5 INJECTION INTRAMUSCULAR; INTRAVENOUS; SUBCUTANEOUS ONCE AS NEEDED
Status: DISCONTINUED | OUTPATIENT
Start: 2019-05-15 | End: 2019-05-15 | Stop reason: HOSPADM

## 2019-05-15 RX ORDER — ONDANSETRON 2 MG/ML
INJECTION INTRAMUSCULAR; INTRAVENOUS
Status: DISCONTINUED | OUTPATIENT
Start: 2019-05-15 | End: 2019-05-15

## 2019-05-15 RX ORDER — ONDANSETRON 2 MG/ML
4 INJECTION INTRAMUSCULAR; INTRAVENOUS EVERY 12 HOURS PRN
Status: DISCONTINUED | OUTPATIENT
Start: 2019-05-15 | End: 2019-05-15 | Stop reason: HOSPADM

## 2019-05-15 RX ORDER — CEFAZOLIN SODIUM 1 G/3ML
2 INJECTION, POWDER, FOR SOLUTION INTRAMUSCULAR; INTRAVENOUS
Status: COMPLETED | OUTPATIENT
Start: 2019-05-15 | End: 2019-05-15

## 2019-05-15 RX ORDER — LIDOCAINE HYDROCHLORIDE 10 MG/ML
0.5 INJECTION, SOLUTION EPIDURAL; INFILTRATION; INTRACAUDAL; PERINEURAL ONCE
Status: DISCONTINUED | OUTPATIENT
Start: 2019-05-15 | End: 2019-05-15 | Stop reason: HOSPADM

## 2019-05-15 RX ORDER — PROPOFOL 10 MG/ML
VIAL (ML) INTRAVENOUS
Status: DISCONTINUED | OUTPATIENT
Start: 2019-05-15 | End: 2019-05-15

## 2019-05-15 RX ORDER — CEPHALEXIN 500 MG/1
500 CAPSULE ORAL EVERY 12 HOURS
Qty: 4 CAPSULE | Refills: 0 | Status: SHIPPED | OUTPATIENT
Start: 2019-05-15 | End: 2019-05-29

## 2019-05-15 RX ORDER — ACETAMINOPHEN 500 MG
1000 TABLET ORAL
Status: COMPLETED | OUTPATIENT
Start: 2019-05-15 | End: 2019-05-15

## 2019-05-15 RX ORDER — FAMOTIDINE 20 MG/1
20 TABLET, FILM COATED ORAL
Status: COMPLETED | OUTPATIENT
Start: 2019-05-15 | End: 2019-05-15

## 2019-05-15 RX ADMIN — LIDOCAINE HYDROCHLORIDE 100 MG: 20 INJECTION, SOLUTION INTRAVENOUS at 09:05

## 2019-05-15 RX ADMIN — ACETAMINOPHEN 1000 MG: 500 TABLET, FILM COATED ORAL at 07:05

## 2019-05-15 RX ADMIN — CARBOXYMETHYLCELLULOSE SODIUM 4 DROP: 2.5 SOLUTION/ DROPS OPHTHALMIC at 09:05

## 2019-05-15 RX ADMIN — DEXAMETHASONE SODIUM PHOSPHATE 8 MG: 4 INJECTION, SOLUTION INTRAMUSCULAR; INTRAVENOUS at 09:05

## 2019-05-15 RX ADMIN — SCOPALAMINE 1 PATCH: 1 PATCH, EXTENDED RELEASE TRANSDERMAL at 07:05

## 2019-05-15 RX ADMIN — SODIUM CHLORIDE, SODIUM LACTATE, POTASSIUM CHLORIDE, AND CALCIUM CHLORIDE: 600; 310; 30; 20 INJECTION, SOLUTION INTRAVENOUS at 09:05

## 2019-05-15 RX ADMIN — SODIUM CHLORIDE, SODIUM LACTATE, POTASSIUM CHLORIDE, AND CALCIUM CHLORIDE: 600; 310; 30; 20 INJECTION, SOLUTION INTRAVENOUS at 08:05

## 2019-05-15 RX ADMIN — PROPOFOL 200 MG: 10 INJECTION, EMULSION INTRAVENOUS at 09:05

## 2019-05-15 RX ADMIN — KETOROLAC TROMETHAMINE 30 MG: 30 INJECTION, SOLUTION INTRAMUSCULAR; INTRAVENOUS at 10:05

## 2019-05-15 RX ADMIN — PHENYLEPHRINE HYDROCHLORIDE 200 MCG: 10 INJECTION INTRAVENOUS at 09:05

## 2019-05-15 RX ADMIN — GLYCOPYRROLATE 0.2 MG: 0.2 INJECTION, SOLUTION INTRAMUSCULAR; INTRAVENOUS at 09:05

## 2019-05-15 RX ADMIN — ONDANSETRON 4 MG: 2 INJECTION INTRAMUSCULAR; INTRAVENOUS at 09:05

## 2019-05-15 RX ADMIN — FAMOTIDINE 20 MG: 20 TABLET, FILM COATED ORAL at 07:05

## 2019-05-15 RX ADMIN — FENTANYL CITRATE 100 MCG: 50 INJECTION, SOLUTION INTRAMUSCULAR; INTRAVENOUS at 09:05

## 2019-05-15 RX ADMIN — MIDAZOLAM HYDROCHLORIDE 2 MG: 1 INJECTION, SOLUTION INTRAMUSCULAR; INTRAVENOUS at 08:05

## 2019-05-15 RX ADMIN — PREGABALIN 150 MG: 75 CAPSULE ORAL at 07:05

## 2019-05-15 RX ADMIN — FENTANYL CITRATE 50 MCG: 50 INJECTION, SOLUTION INTRAMUSCULAR; INTRAVENOUS at 10:05

## 2019-05-15 RX ADMIN — CEFAZOLIN 2 G: 330 INJECTION, POWDER, FOR SOLUTION INTRAMUSCULAR; INTRAVENOUS at 09:05

## 2019-05-15 NOTE — DISCHARGE INSTRUCTIONS
No heavy lifting over 10bs for 2 weeks. Minimize bending/squatting. No rigorous/strenuous exercise. Okay to walk/use stairs. Do not strain for bowel movement.   Nothing per vagina for 6 weeks (ie tampons/vaginal intercourse). Use pads for any vaginal bleeding.          Anesthesia: After Your Surgery  Youve just had surgery. During surgery, you received medication called anesthesia to keep you comfortable and pain-free. After surgery, you may experience some pain or nausea. This is common. Here are some tips for feeling better and recovering after surgery.    Going home  Your doctor or nurse will show you how to take care of yourself when you go home. He or she will also answer your questions. Have an adult family member or friend drive you home. For the first 24 hours after your surgery:    · Do not drive or use heavy equipment.  · Do not make important decisions or sign legal documents.  · Avoid alcohol.  · Have someone stay with you, if needed. He or she can watch for problems and help keep you safe.    Be sure to keep all follow-up appointments with your doctor. And rest after your procedure for as long as your doctor tells you to.    Coping with pain  If you have pain after surgery, pain medication will help you feel better. Take it as directed, before pain becomes severe. Also, ask your doctor or pharmacist about other ways to control pain, such as with heat, ice, and relaxation. And follow any other instructions your surgeon or nurse gives you.    URINARY RETENTION  Should you experience a decrease in your urine output or are unable to urinate following surgery, this can be due to the medications given during surgery.  We recommend you going to the nearest Emergency Department.    Tips for taking pain medication  To get the best relief possible, remember these points:    · Pain medications can upset your stomach. Taking them with a little food may help.  · Most pain relievers taken by mouth need at least 20  to 30 minutes to take effect.  · Taking medication on a schedule can help you remember to take it. Try to time your medication so that you can take it before beginning an activity, such as dressing, walking, or sitting down for dinner.  · Constipation is a common side effect of pain medications. Contact your doctor before taking any medications like laxatives or stool softeners to help relieve constipation. Also ask about any dietary restrictions, because drinking lots of fluids and eating foods like fruits and vegetables that are high in fiber can also help. Remember, dont take laxatives unless your surgeon has prescribed them.  · Mixing alcohol and pain medication can cause dizziness and slow your breathing. It can even be fatal. Dont drink alcohol while taking pain medication.  · Pain medication can slow your reflexes. Dont drive or operate machinery while taking pain medication.    If your health care provider tells you to take acetaminophen to help relieve your pain, ask him or her how much you are supposed to take each day. (Acetaminophen is the generic name for Tylenol and other brand-name pain relievers.) Acetaminophen or other pain relievers may interact with your prescription medicines or other over-the-counter (OTC) drugs. Some prescription medications contain acetaminophen along with other active ingredients. Using both prescription and OTC acetaminophen for pain can cause you to overdose. The FDA recommends that you read the labels on your OTC medications carefully. This will help you to clearly understand the list of active ingredients, dosing instructions, and any warnings. It may also help you avoid taking too much acetaminophen. If you have questions or don't understand the information, ask your pharmacist or health care provider to explain it to you before you take the OTC medication.    Managing nausea  Some people have an upset stomach after surgery. This is often due to anesthesia, pain, pain  medications, or the stress of surgery. The following tips will help you manage nausea and get good nutrition as you recover. If you were on a special diet before surgery, ask your doctor if you should follow it during recovery. These tips may help:    · Dont push yourself to eat. Your body will tell you when to eat and how much.  · Start off with clear liquids and soup. They are easier to digest.  · Progress to semi-solid foods (mashed potatoes, applesauce, and gelatin) as you feel ready.  · Slowly move to solid foods. Dont eat fatty, rich, or spicy foods at first.  · Dont force yourself to have three large meals a day. Instead, eat smaller amounts more often.  · Take pain medications with a small amount of solid food, such as crackers or toast to avoid nausea.      Call your surgeon if    · You feel too sleepy, dizzy, or groggy (medication may be too strong).  · You have side effects like nausea, vomiting, or skin changes (rash, itching, or hives).     © 7225-2535 [employer]. [address] [place] [address]. All rights reserved. This information is not intended as a substitute for professional medical care. Always follow your healthcare professional's instructions.    PLEASE FOLLOW ANY OTHER INSTRUCTIONS PROVIDED TO YOU BY DR. REYES!

## 2019-05-15 NOTE — OR NURSING
Bladder trial performed with 180 ml NS. Tolerated well. Able to void approximately 150 ml clear, yellow urine. Notified Dr. Enriquez; states okay to be discharged home.

## 2019-05-15 NOTE — TRANSFER OF CARE
"Anesthesia Transfer of Care Note    Patient: Zenaida Sung    Procedure(s) Performed: Procedure(s) (LRB):  SLING-MID URETHRAL - [employer] Solyx sling; flexible cystoscopy (N/A)    Patient location: PACU    Anesthesia Type: general    Transport from OR: Transported from OR on 2-3 L/min O2 by NC with adequate spontaneous ventilation    Post pain: adequate analgesia    Post assessment: no apparent anesthetic complications and tolerated procedure well    Post vital signs: stable    Level of consciousness: awake, alert and oriented    Nausea/Vomiting: no nausea/vomiting    Complications: none    Transfer of care protocol was followed      Last vitals:   Visit Vitals  /74 (BP Location: Right arm, Patient Position: Sitting)   Pulse 71   Temp 36.6 °C (97.9 °F) (Oral)   Resp 16   Ht 5' 5" (1.651 m)   Wt 81.6 kg (180 lb)   LMP 04/19/2019   SpO2 100%   Breastfeeding? No   BMI 29.95 kg/m²     "

## 2019-05-15 NOTE — ANESTHESIA POSTPROCEDURE EVALUATION
Anesthesia Post Evaluation    Patient: Zenaida Sung    Procedure(s) Performed: Procedure(s) (LRB):  SLING-MID URETHRAL - [employer] Solyx sling; flexible cystoscopy (N/A)    Final Anesthesia Type: general  Patient location during evaluation: PACU  Patient participation: Yes- Able to Participate  Level of consciousness: awake and alert  Post-procedure vital signs: reviewed and stable  Pain management: adequate  Airway patency: patent  PONV status at discharge: No PONV  Anesthetic complications: no      Cardiovascular status: hemodynamically stable  Respiratory status: unassisted  Hydration status: euvolemic  Follow-up not needed.          Vitals Value Taken Time   /68 5/15/2019 12:30 PM   Temp 36.7 °C (98 °F) 5/15/2019 11:34 AM   Pulse 64 5/15/2019 12:30 PM   Resp 16 5/15/2019 12:30 PM   SpO2 100 % 5/15/2019 12:30 PM         Event Time     Out of Recovery 11:29:23          Pain/Charmaine Score: Pain Rating Prior to Med Admin: 0 (5/15/2019  7:12 AM)  Charmaine Score: 10 (5/15/2019 12:30 PM)

## 2019-05-15 NOTE — OR NURSING
Zenaida Sung has met all discharge criteria from Phase II. Vital Signs are stable, ambulating  without difficulty. Discharge instructions given, patient verbalized understanding. Discharged from facility via wheelchair in stable condition.

## 2019-05-15 NOTE — BRIEF OP NOTE
[facility]  Brief Operative Note     SUMMARY     Surgery Date: 5/15/2019     Surgeon(s) and Role:     * Danielle Enriquez MD - Primary    Assisting Surgeon: None    Pre-op Diagnosis:  Stress incontinence of urine [N39.3]    Post-op Diagnosis:  Post-Op Diagnosis Codes:     * Stress incontinence of urine [N39.3]    Procedure(s) (LRB):  MID URETHRAL SLING - [email or web address] Solyx sling  FLEXIBLE CYSTOSCOPY    Anesthesia: Choice    Description of the findings of the procedure: Placement of sling initially showed a curled lie of the sling. Unable to flatten sling and therefore removed. Repeat sling placement done with flat, appropriately tensioned sling at mid urethra.     Findings/Key Components: Normal cystoscopy - no injury noted.     Estimated Blood Loss: 30cc         Specimens:   Specimen (12h ago, onward)    None          Discharge Note    SUMMARY     Admit Date: 5/15/2019    Discharge Date and Time:  05/15/2019 8:56 AM    Hospital Course (synopsis of major diagnoses, care, treatment, and services provided during the course of the hospital stay): Uncomplicated MUS     Final Diagnosis: Post-Op Diagnosis Codes:     * Stress incontinence of urine [N39.3]    Disposition: Home or Self Care    Follow Up/Patient Instructions:     Medications:  Reconciled Home Medications:      Medication List      START taking these medications    cephALEXin 500 MG capsule  Commonly known as:  KEFLEX  Take 1 capsule (500 mg total) by mouth every 12 (twelve) hours.     HYDROcodone-acetaminophen 5-325 mg per tablet  Commonly known as:  NORCO  Take 1 tablet by mouth every 6 (six) hours as needed for Pain.     phenazopyridine 100 MG tablet  Commonly known as:  PYRIDIUM  Take 2 tablets (200 mg total) by mouth 3 (three) times daily as needed (painful urination).        CONTINUE taking these medications    ALBUTEROL INHL  Inhale into the lungs. 2 puffs twice daily     fluticasone propionate 50 mcg/actuation nasal  spray  Commonly known as:  FLONASE  1 spray by Each Nare route once daily.     ibuprofen 800 MG tablet  Commonly known as:  ADVIL,MOTRIN     levothyroxine 112 MCG tablet  Commonly known as:  SYNTHROID  Take a 1/2 TABLET BY MOUTH ON SUNDAY AND ONE FULL TABLET ONCE DAILY THE REMAINDER OF THE WEEK     montelukast 10 mg tablet  Commonly known as:  SINGULAIR  Take 1 tablet (10 mg total) by mouth every evening.     ZyrTEC 10 MG tablet  Generic drug:  cetirizine  Take 10 mg by mouth once daily.          Discharge Procedure Orders   Diet general     Call MD for:  temperature >100.4     Call MD for:  persistent nausea and vomiting     Call MD for:  severe uncontrolled pain     Call MD for:  difficulty breathing, headache or visual disturbances     No dressing needed     Call MD for:  redness, tenderness, or signs of infection (pain, swelling, redness, odor or green/yellow discharge around incision site)     Lifting restrictions   Order Comments: No heavy lifting over 10bs for 2 weeks. Minimize bending/squatting. No rigorous/strenuous exercise. Okay to walk/use stairs. Do not strain for bowel movement.   Nothing per vagina for 6 weeks (ie tampons/vaginal intercourse). Use pads for any vaginal bleeding.     Follow-up Information     Danielle Enriquez MD In 2 weeks.    Specialty:  Urology  Why:  For post-op follow up  Contact information:  [address] [address] [phone]

## 2019-05-15 NOTE — OP NOTE
DATE OF PROCEDURE: 5/15/19    SURGEON: Danielle Enriquez M.D.    PREOPERATIVE DIAGNOSIS: Stress urinary incontinence.    POSTOPERATIVE DIAGNOSIS: Stress urinary incontinence.    PROCEDURE PERFORMED: Single incision midurethral sling (Solyx) with cystoscopy.    INDICATIONS FOR PROCEDURE: 44yo F with JULIAN demonstrated on cough test. Discussed options and she elected for MUS. All risks, benefits, alternative thouroughly discussed pre-operatively.     DESCRIPTION OF PROCEDURE: She was brought to the Operating Room and placed  under general LMA anesthesia. Full timeout procedures were performed identifying the correct patient and procedure. Appropriate IV antibiotics with Ancef was given prior to commencement of surgery. The patient was placed in a dorsal lithotomy position in the operative area was clipped with electric clippers and removal of any hair near the surgery site. She was prepped and draped in the usual sterile fashion.    Labial retraction sutures were placed into the hymenal ring as well into the anterior aspect of the introitus to exclude the anus from the operative field. A weighted speculum was placed in the vagina. A 16-Danish Sin catheter was passed into bladder and 10 mL of sterile water was placed into the balloon. The Sin catheter left on the sterile field. Bladder neck was able to be easily palpated with the Sin balloon in place. Midline was marked with a marking pen and bladder neck was also marked at the area that the Sin catheter was able to be palpated.    Hydrodissection was then done at the midline of the periurethral space. Approximately 7 mL of injectable saline was injected into the periurethral space for hydrodissection. A #15 blade scalpel was then used in the midline to make an incision approximately 2 cm in length. This was done away from the bladder neck and away from the urethral meatus.    Two Allis clamps were then used first on the patient's right side to develop a flap to  "allow appropriate placement of the sling. Care was taken to use a full-thickness flap to prevent post-op extrusion. This was done using a combination of sharp and blunt dissection with Metzenbaum scissors. Dissection was carried back to allow placement of the sling around the obturator foramen without causing any buttonholing of the vaginal sulcus. Once adequate dissection was completed on the patient's right side, attention was turned to the left side. Again, two Allis clamps and Metzenbaum scissors were used to develop a vaginal flap ensuring appropriate depth and thickness of the flap.     Once adequate depth was obtained bilaterally through the periurethral tunnel, the sling was prepped and placed on the sterile field. The sling was soaked in Bacitracin solution. The sling was then placed on to the passer needle. The right side of the sling was then placed into the obturator foramen into the muscle layer. A slight "pop" was felt that was appropriate and the sling was advanced into the muscle. This was further advanced into the appropriate location. Once midline point of the sling was verified in the appropriate position, the passer needle was released from that side of the mesh. The midline of the sling was noted to be in the appropriate position on the urethra.     Similar procedure was then performed on the patient's contralateral side after the sling was placed back on the passer needle. This was able to be passed into the appropriate position on her left side. Again, the passer needle was advanced into the appropriate membrane around the obturator foramen. The needle was advanced into place. All positioning of the sling was evaluated. The midline of the sling was noted to be directly  at the midline of the urethra.  Upon inspection of the sling, the sling was noted to not be lying flat across the mid urethra.  The sling was noted to be curled with initial concern for over tensioning.  The sling was evaluated " and was noted not to be over tension as it was not too tight on the urethra.  Attempts were made to flatten the sling in the appropriate position which were unsuccessful.  Due to this, decision was made to remove this sling due to the curled non flat nature of the mesh placement.  The sling was then removed, and a new sling was placed in similar fashion. The sling was then evaluated and noted to lie flat in the appropriate position across the mid urethra.  There was no evidence over-tensioning of the sling and the sling was lying flat and not under tension. Therefore, the needle passer was released from the side. Again the sling was observed and felt to be in the appropriate position and tension.     The 16-Armenian Bro catheter was then removed from the urethra and a 16-Armenian flexible cystoscope was placed into the bladder. The bladder was filled and full cystoscopy was performed. There were no areas of lesions or injury to the bladder. Bilateral ureteral orifices were able to be identified and it showed clear efflux of urine. Careful inspection of the urethra upon removal of the cystoscope showed no evidence of any injury to the patient's urethra.    The vaginal vault was palpated to ensure there was no mesh buttonholing. Again, no mesh was noted in the vaginal sulcus and therefore, was felt appropriate to conclude the sling procedure. The 16-Fr bro catheter was replaced back into the bladder. 10cc of sterile water was placed into the balloon.     The vaginal incision was then closed with a running 2-0 Vicryl in a locking fashion. Hemostasis was noted after placement of the mesh. Vaginal packing with Premarin cream was also placed into the vagina to provide postoperative hemostasis.     The labia retention sutures were removed and the procedure was completed. The patient was then awakened from general anesthesia and transferred to the PACU in stable condition.    All counts were correct at the conclusion of the  procedure x2.    COMPLICATIONS:  Initial curled placement of the mesh therefore removed.  Second sling placed in appropriate position.    FINDINGS: Appropriate placement of single incision sling in mid urethral location   with no evidence of any injury to the bladder, ureters, or urethra.    ESTIMATED BLOOD LOSS: 30 mL.    DRAINS: A 16-German Sin catheter.    SPECIMENS: None    PATIENT CONDITION: The patient is stable and deemed appropriate for discharge   home after she completes a voiding trial.    DISPOSITION: She will have a voiding trial done when she is awake, alert and   ambulatory. Vaginal packing will be removed at the time voiding trial. She   will follow up in 2 weeks for postoperative check.

## 2019-05-15 NOTE — INTERVAL H&P NOTE
The patient has been examined and the H&P has been reviewed:    I concur with the findings and no changes have occurred since H&P was written.    Anesthesia/Surgery risks, benefits and alternative options discussed and understood by patient/family.          Active Hospital Problems    Diagnosis  POA    Stress incontinence of urine [N39.3]  Yes      Resolved Hospital Problems   No resolved problems to display.

## 2019-05-29 ENCOUNTER — OFFICE VISIT (OUTPATIENT)
Dept: UROLOGY | Facility: CLINIC | Age: 44
End: 2019-05-29
Attending: UROLOGY
Payer: COMMERCIAL

## 2019-05-29 VITALS
HEIGHT: 65 IN | DIASTOLIC BLOOD PRESSURE: 79 MMHG | HEART RATE: 79 BPM | SYSTOLIC BLOOD PRESSURE: 118 MMHG | WEIGHT: 180 LBS | BODY MASS INDEX: 29.99 KG/M2

## 2019-05-29 DIAGNOSIS — N39.46 MIXED INCONTINENCE URGE AND STRESS: ICD-10-CM

## 2019-05-29 DIAGNOSIS — R31.29 MICROHEMATURIA: ICD-10-CM

## 2019-05-29 DIAGNOSIS — N39.3 STRESS INCONTINENCE OF URINE: Primary | ICD-10-CM

## 2019-05-29 LAB
BILIRUB SERPL-MCNC: NORMAL MG/DL
BLOOD URINE, POC: NORMAL
COLOR, POC UA: YELLOW
GLUCOSE UR QL STRIP: NORMAL
KETONES UR QL STRIP: NORMAL
LEUKOCYTE ESTERASE URINE, POC: NORMAL
NITRITE, POC UA: NORMAL
PH, POC UA: 8
PROTEIN, POC: NORMAL
SPECIFIC GRAVITY, POC UA: 1
UROBILINOGEN, POC UA: NORMAL

## 2019-05-29 PROCEDURE — 81002 POCT URINE DIPSTICK WITHOUT MICROSCOPE: ICD-10-PCS | Mod: S$GLB,,, | Performed by: UROLOGY

## 2019-05-29 PROCEDURE — 81002 URINALYSIS NONAUTO W/O SCOPE: CPT | Mod: S$GLB,,, | Performed by: UROLOGY

## 2019-05-29 PROCEDURE — 99024 POSTOP FOLLOW-UP VISIT: CPT | Mod: S$GLB,,, | Performed by: UROLOGY

## 2019-05-29 PROCEDURE — 99024 PR POST-OP FOLLOW-UP VISIT: ICD-10-PCS | Mod: S$GLB,,, | Performed by: UROLOGY

## 2019-05-29 NOTE — PROGRESS NOTES
"  Subjective:       Zenaida Sung is a 43 y.o. female who is an established patient who was self-referred for evaluation of UI.      She reports UI - mainly JULIAN with laugh, cough, sneeze, running, lifting. Symptoms are worsening. JULIAN with moderate volume leakage, daily. She also reports increased urgency, UUI. She reports urinary frequency - voids estimated 4-6x daily. Nocturia x 1-3. Admits to drinking a lot of water. No prior attempts at treatment. Reports she was told about Kegels exercises when pregnant but "did not work for her." One . Not wearing pads.     Occasional UTI. Yeast infection always with abx. Denies hematuria, nephrolithiasis. Nocturnal enuresis until age 11.     She reports her father  of bladder cancer. She is a nonsmoker. 0 RBCs on UA micro.    PVR (bladder scan) initial visit - 11cc    Cysto  - normal cysto. +JULIAN with cough while standing.    2019  She is s/p Solyx MUS on 5/15/19. Voiding well since procedure.Denies pain since procedure. No further JULIAN with cough, sneeze, or laugh.     PVR (bladder scan) today - 0cc      The following portions of the patient's history were reviewed and updated as appropriate: allergies, current medications, past family history, past medical history, past social history, past surgical history and problem list.    Review of Systems  Constitutional: no fever or chills  ENT: no nasal congestion or sore throat  Respiratory: no cough or shortness of breath  Cardiovascular: no chest pain or palpitations  Gastrointestinal: no nausea or vomiting, tolerating diet  Genitourinary: as per HPI  Hematologic/Lymphatic: no easy bruising or lymphadenopathy  Musculoskeletal: no arthralgias or myalgias  Skin: no rashes or lesions  Neurological: no seizures or tremors  Behavioral/Psych: no auditory or visual hallucinations        Objective:    Vitals: /79 (BP Location: Right arm, Patient Position: Sitting, BP Method: Medium (Automatic))   " "Pulse 79   Ht 5' 5" (1.651 m)   Wt 81.6 kg (180 lb)   BMI 29.95 kg/m²     Physical Exam   General: well developed, well nourished in no acute distress  Head: normocephalic, atraumatic  Neck: supple, trachea midline, no obvious enlargement of thyroid  HEENT: EOMI, mucus membranes moist, sclera anicteric, no hearing impairment  Lungs: symmetric expansion, non-labored breathing  Cardiovascular: regular rate and rhythm, normal pulses  Abdomen: soft, non tender, non distended, no palpable masses, no hepatosplenomegaly, no hernias, no CVA tenderness  Musculoskeletal: no peripheral edema, normal ROM in bilateral upper and lower extremities  Lymphatics: no cervical or inguinal lymphadenopathy  Skin: no rashes or lesions  Neuro: alert and oriented x 3, no gross deficits  Psych: normal judgment and insight, normal mood/affect and non-anxious  Genitourinary:   patient declined exam      Lab Review   Urine analysis today in clinic shows positive for red blood cells 5-10    No results found for: WBC, HGB, HCT, MCV, PLT  No results found for: CREATININE, BUN    Imaging  NA       Assessment/Plan:      1. Stress incontinence of urine    - JULIAN: Kegels, PFPT, pessary, bulking agent, MUS.   - Most bothersome   - Cysto confirmed JULIAN   - s/p Solyx MUS 5/15/19 - doing great     2. Mixed incontinence urge and stress    - Discussed difference of UUI and JULIAN components. Reviewed etiology and workup of each.   - JULIAN: Kegels, PFPT, pessary, bulking agent, MUS.   - UUI: Behavioral changes, PFPT, anticholinergics, mirabegron. Botox/InterStim for refractory UUI.   - JULIAN per above   - UUI less bothersome but worsening. Trial Oxytrol patch OTC.      3. Microhematuria   - Father  bladder cancer   - 5-10 RBCs on UA macro. Discussed false positive.   - UA micro - 0 RBCs      Follow up in 3 weeks for PE     "

## 2020-12-09 ENCOUNTER — CLINICAL SUPPORT (OUTPATIENT)
Dept: OTHER | Facility: CLINIC | Age: 45
End: 2020-12-09

## 2020-12-09 DIAGNOSIS — Z00.8 ENCOUNTER FOR OTHER GENERAL EXAMINATION: ICD-10-CM

## 2020-12-10 VITALS — HEIGHT: 65 IN | BODY MASS INDEX: 29.95 KG/M2

## 2020-12-10 LAB
GLUCOSE SERPL-MCNC: 98 MG/DL (ref 60–140)
HDLC SERPL-MCNC: 40 MG/DL
POC CHOLESTEROL, TOTAL: 155 MG/DL
TRIGL SERPL-MCNC: 44 MG/DL

## 2021-03-26 ENCOUNTER — IMMUNIZATION (OUTPATIENT)
Dept: PRIMARY CARE CLINIC | Facility: CLINIC | Age: 46
End: 2021-03-26
Payer: COMMERCIAL

## 2021-03-26 DIAGNOSIS — Z23 NEED FOR VACCINATION: Primary | ICD-10-CM

## 2021-03-26 PROCEDURE — 91301 COVID-19, MRNA, LNP-S, PF, 100 MCG/0.5 ML DOSE VACCINE: ICD-10-PCS | Mod: ,,, | Performed by: FAMILY MEDICINE

## 2021-03-26 PROCEDURE — 0011A COVID-19, MRNA, LNP-S, PF, 100 MCG/0.5 ML DOSE VACCINE: CPT | Mod: CV19,,, | Performed by: FAMILY MEDICINE

## 2021-03-26 PROCEDURE — 0011A COVID-19, MRNA, LNP-S, PF, 100 MCG/0.5 ML DOSE VACCINE: ICD-10-PCS | Mod: CV19,,, | Performed by: FAMILY MEDICINE

## 2021-03-26 PROCEDURE — 91301 COVID-19, MRNA, LNP-S, PF, 100 MCG/0.5 ML DOSE VACCINE: CPT | Mod: ,,, | Performed by: FAMILY MEDICINE

## 2021-04-24 ENCOUNTER — IMMUNIZATION (OUTPATIENT)
Dept: PRIMARY CARE CLINIC | Facility: CLINIC | Age: 46
End: 2021-04-24
Payer: OTHER GOVERNMENT

## 2021-04-24 DIAGNOSIS — Z23 NEED FOR VACCINATION: Primary | ICD-10-CM

## 2021-04-24 PROCEDURE — 0012A COVID-19, MRNA, LNP-S, PF, 100 MCG/0.5 ML DOSE VACCINE: CPT | Mod: PBBFAC | Performed by: EMERGENCY MEDICINE

## 2023-01-05 ENCOUNTER — TELEPHONE (OUTPATIENT)
Dept: OBSTETRICS AND GYNECOLOGY | Facility: CLINIC | Age: 48
End: 2023-01-05
Payer: COMMERCIAL

## 2023-03-16 ENCOUNTER — OFFICE VISIT (OUTPATIENT)
Dept: OBSTETRICS AND GYNECOLOGY | Facility: CLINIC | Age: 48
End: 2023-03-16
Payer: COMMERCIAL

## 2023-03-16 VITALS
WEIGHT: 197.75 LBS | SYSTOLIC BLOOD PRESSURE: 118 MMHG | DIASTOLIC BLOOD PRESSURE: 82 MMHG | BODY MASS INDEX: 32.95 KG/M2 | HEIGHT: 65 IN

## 2023-03-16 DIAGNOSIS — Z12.31 ENCOUNTER FOR SCREENING MAMMOGRAM FOR MALIGNANT NEOPLASM OF BREAST: ICD-10-CM

## 2023-03-16 DIAGNOSIS — R68.82 DECREASED LIBIDO: ICD-10-CM

## 2023-03-16 DIAGNOSIS — Z01.419 ENCOUNTER FOR WELL WOMAN EXAM WITH ROUTINE GYNECOLOGICAL EXAM: Primary | ICD-10-CM

## 2023-03-16 DIAGNOSIS — Z12.4 ENCOUNTER FOR PAPANICOLAOU SMEAR FOR CERVICAL CANCER SCREENING: ICD-10-CM

## 2023-03-16 PROCEDURE — 1159F MED LIST DOCD IN RCRD: CPT | Mod: CPTII,S$GLB,, | Performed by: STUDENT IN AN ORGANIZED HEALTH CARE EDUCATION/TRAINING PROGRAM

## 2023-03-16 PROCEDURE — 1160F PR REVIEW ALL MEDS BY PRESCRIBER/CLIN PHARMACIST DOCUMENTED: ICD-10-PCS | Mod: CPTII,S$GLB,, | Performed by: STUDENT IN AN ORGANIZED HEALTH CARE EDUCATION/TRAINING PROGRAM

## 2023-03-16 PROCEDURE — 3079F PR MOST RECENT DIASTOLIC BLOOD PRESSURE 80-89 MM HG: ICD-10-PCS | Mod: CPTII,S$GLB,, | Performed by: STUDENT IN AN ORGANIZED HEALTH CARE EDUCATION/TRAINING PROGRAM

## 2023-03-16 PROCEDURE — 3074F PR MOST RECENT SYSTOLIC BLOOD PRESSURE < 130 MM HG: ICD-10-PCS | Mod: CPTII,S$GLB,, | Performed by: STUDENT IN AN ORGANIZED HEALTH CARE EDUCATION/TRAINING PROGRAM

## 2023-03-16 PROCEDURE — 88142 CYTOPATH C/V THIN LAYER: CPT | Performed by: STUDENT IN AN ORGANIZED HEALTH CARE EDUCATION/TRAINING PROGRAM

## 2023-03-16 PROCEDURE — 99999 PR PBB SHADOW E&M-EST. PATIENT-LVL III: CPT | Mod: PBBFAC,,, | Performed by: STUDENT IN AN ORGANIZED HEALTH CARE EDUCATION/TRAINING PROGRAM

## 2023-03-16 PROCEDURE — 1160F RVW MEDS BY RX/DR IN RCRD: CPT | Mod: CPTII,S$GLB,, | Performed by: STUDENT IN AN ORGANIZED HEALTH CARE EDUCATION/TRAINING PROGRAM

## 2023-03-16 PROCEDURE — 1159F PR MEDICATION LIST DOCUMENTED IN MEDICAL RECORD: ICD-10-PCS | Mod: CPTII,S$GLB,, | Performed by: STUDENT IN AN ORGANIZED HEALTH CARE EDUCATION/TRAINING PROGRAM

## 2023-03-16 PROCEDURE — 3008F BODY MASS INDEX DOCD: CPT | Mod: CPTII,S$GLB,, | Performed by: STUDENT IN AN ORGANIZED HEALTH CARE EDUCATION/TRAINING PROGRAM

## 2023-03-16 PROCEDURE — 3008F PR BODY MASS INDEX (BMI) DOCUMENTED: ICD-10-PCS | Mod: CPTII,S$GLB,, | Performed by: STUDENT IN AN ORGANIZED HEALTH CARE EDUCATION/TRAINING PROGRAM

## 2023-03-16 PROCEDURE — 99999 PR PBB SHADOW E&M-EST. PATIENT-LVL III: ICD-10-PCS | Mod: PBBFAC,,, | Performed by: STUDENT IN AN ORGANIZED HEALTH CARE EDUCATION/TRAINING PROGRAM

## 2023-03-16 PROCEDURE — 99386 PREV VISIT NEW AGE 40-64: CPT | Mod: S$GLB,,, | Performed by: STUDENT IN AN ORGANIZED HEALTH CARE EDUCATION/TRAINING PROGRAM

## 2023-03-16 PROCEDURE — 99386 PR PREVENTIVE VISIT,NEW,40-64: ICD-10-PCS | Mod: S$GLB,,, | Performed by: STUDENT IN AN ORGANIZED HEALTH CARE EDUCATION/TRAINING PROGRAM

## 2023-03-16 PROCEDURE — 3074F SYST BP LT 130 MM HG: CPT | Mod: CPTII,S$GLB,, | Performed by: STUDENT IN AN ORGANIZED HEALTH CARE EDUCATION/TRAINING PROGRAM

## 2023-03-16 PROCEDURE — 3079F DIAST BP 80-89 MM HG: CPT | Mod: CPTII,S$GLB,, | Performed by: STUDENT IN AN ORGANIZED HEALTH CARE EDUCATION/TRAINING PROGRAM

## 2023-03-16 PROCEDURE — 87624 HPV HI-RISK TYP POOLED RSLT: CPT | Performed by: STUDENT IN AN ORGANIZED HEALTH CARE EDUCATION/TRAINING PROGRAM

## 2023-03-16 RX ORDER — LEVOTHYROXINE SODIUM 75 UG/1
75 TABLET ORAL
COMMUNITY
Start: 2023-02-28

## 2023-03-16 RX ORDER — TIRZEPATIDE 5 MG/.5ML
INJECTION, SOLUTION SUBCUTANEOUS
COMMUNITY
Start: 2022-11-08

## 2023-03-22 LAB
HPV HR 12 DNA SPEC QL NAA+PROBE: NEGATIVE
HPV16 AG SPEC QL: NEGATIVE
HPV18 DNA SPEC QL NAA+PROBE: NEGATIVE

## 2023-03-24 LAB
FINAL PATHOLOGIC DIAGNOSIS: NORMAL
Lab: NORMAL

## 2023-03-27 ENCOUNTER — HOSPITAL ENCOUNTER (OUTPATIENT)
Dept: RADIOLOGY | Facility: HOSPITAL | Age: 48
Discharge: HOME | End: 2023-03-27
Attending: STUDENT IN AN ORGANIZED HEALTH CARE EDUCATION/TRAINING PROGRAM
Payer: COMMERCIAL

## 2023-03-27 VITALS — WEIGHT: 195 LBS | HEIGHT: 65 IN | BODY MASS INDEX: 32.49 KG/M2

## 2023-03-27 DIAGNOSIS — Z12.31 ENCOUNTER FOR SCREENING MAMMOGRAM FOR MALIGNANT NEOPLASM OF BREAST: ICD-10-CM

## 2023-03-27 PROCEDURE — 77067 SCR MAMMO BI INCL CAD: CPT | Mod: 26,,, | Performed by: RADIOLOGY

## 2023-03-27 PROCEDURE — 77067 SCR MAMMO BI INCL CAD: CPT | Mod: TC

## 2023-03-27 PROCEDURE — 77063 MAMMO DIGITAL SCREENING BILAT WITH TOMO: ICD-10-PCS | Mod: 26,,, | Performed by: RADIOLOGY

## 2023-03-27 PROCEDURE — 77063 BREAST TOMOSYNTHESIS BI: CPT | Mod: 26,,, | Performed by: RADIOLOGY

## 2023-03-27 PROCEDURE — 77067 MAMMO DIGITAL SCREENING BILAT WITH TOMO: ICD-10-PCS | Mod: 26,,, | Performed by: RADIOLOGY

## 2023-03-28 NOTE — PROGRESS NOTES
History & Physical  Gynecology      SUBJECTIVE:     Chief Complaint: Well Woman       History of Present Illness:  47 y.o. female  here for WWE. Patient's last menstrual period was 2023..      Had sling with Dr Enriquez in 2019 which has helped a lot.   Late for Feb period which is not the norm for her. C/o decreased sex drive which is a big concern for her. She is sexually active with monogamous female partner.   No prior severe cervical dysplasia.     Review of patient's allergies indicates:  No Known Allergies    Past Medical History:   Diagnosis Date    Allergy     Hypothyroidism     New-onset Mild persistent asthma without complication: Symptomatic 2018     Past Surgical History:   Procedure Laterality Date    BREAST RECONSTRUCTION       SECTION      COSMETIC SURGERY      INSERTION OF MIDURETHRAL SLING N/A 5/15/2019    Procedure: SLING-MID URETHRAL - [employer] Solyx sling; flexible cystoscopy;  Surgeon: Danielle Enriquez MD;  Location: [facility];  Service: Urology;  Laterality: N/A;    LASIK      MOUTH SURGERY       OB History    No obstetric history on file.       Family History   Problem Relation Age of Onset    Heart disease Paternal Grandmother     Allergies Father     Bladder Cancer Father     Stroke Mother     Allergies Brother     Breast cancer Paternal Cousin     Breast cancer Paternal Cousin     Breast cancer Paternal Aunt     Heart disease Paternal Aunt     Pancreatic cancer Paternal Aunt      Social History     Tobacco Use    Smoking status: Never    Smokeless tobacco: Never   Substance Use Topics    Alcohol use: Yes     Comment: socially    Drug use: No       Current Outpatient Medications   Medication Sig    ALBUTEROL INHL Inhale into the lungs. 2 puffs twice daily    cetirizine (ZYRTEC) 10 MG tablet Take 10 mg by mouth once daily.    fluticasone (FLONASE) 50 mcg/actuation nasal spray 1 spray by Each Nare route once daily.    ibuprofen (ADVIL,MOTRIN) 800 MG tablet      levothyroxine (SYNTHROID) 75 MCG tablet Take 75 mcg by mouth.    montelukast (SINGULAIR) 10 mg tablet Take 1 tablet (10 mg total) by mouth every evening.    MOUNJARO 5 mg/0.5 mL PnIj SMARTSI Milligram(s) SUB-Q Once a Week     Current Facility-Administered Medications   Medication    ciprofloxacin HCl tablet 500 mg         Review of Systems:  Review of Systems   Constitutional:  Negative for chills and fever.   HENT:  Negative for nasal congestion.    Respiratory:  Negative for cough and shortness of breath.    Cardiovascular:  Negative for chest pain, palpitations and leg swelling.   Gastrointestinal:  Negative for abdominal pain, nausea and vomiting.   Endocrine: Positive for hypothyroidism. Negative for diabetes and hyperthyroidism.   Genitourinary:  Positive for decreased libido. Negative for dysuria, menstrual problem, pelvic pain and vaginal discharge.   Musculoskeletal:  Negative for myalgias.   Integumentary:  Negative for rash, hair changes, breast mass, nipple discharge, breast skin changes and breast tenderness.   Neurological:  Negative for seizures, syncope and headaches.   Hematological:  Does not bruise/bleed easily.   Psychiatric/Behavioral:  Negative for depression. The patient is not nervous/anxious.    Breast: Negative for lump, mass, mastodynia, nipple discharge, skin changes and tenderness     OBJECTIVE:     Physical Exam:  Physical Exam  Exam conducted with a chaperone present.   Constitutional:       General: She is not in acute distress.     Appearance: Normal appearance. She is well-developed.   HENT:      Head: Normocephalic and atraumatic.      Nose: No epistaxis.   Eyes:      Conjunctiva/sclera: Conjunctivae normal.   Pulmonary:      Effort: Pulmonary effort is normal. No respiratory distress.   Chest:   Breasts:     Right: No inverted nipple, mass, nipple discharge, skin change or tenderness.      Left: No inverted nipple, mass, nipple discharge, skin change or tenderness.   Abdominal:       General: There is no distension.      Palpations: Abdomen is soft. There is no mass.      Tenderness: There is no abdominal tenderness. There is no guarding or rebound.      Hernia: No hernia is present.   Genitourinary:     General: Normal vulva.      Pubic Area: No rash.       Labia:         Right: No rash, tenderness or lesion.         Left: No rash, tenderness or lesion.       Urethra: No prolapse, urethral pain, urethral swelling or urethral lesion.      Vagina: Normal. No vaginal discharge, tenderness or bleeding.      Cervix: No cervical motion tenderness, discharge, friability or lesion.      Uterus: Normal. Not enlarged and not tender.       Adnexa: Right adnexa normal and left adnexa normal.        Right: No mass, tenderness or fullness.          Left: No mass, tenderness or fullness.     Musculoskeletal:         General: No tenderness. Normal range of motion.      Right lower leg: No edema.      Left lower leg: No edema.   Skin:     General: Skin is warm and dry.      Findings: No erythema.   Neurological:      Mental Status: She is alert and oriented to person, place, and time.   Psychiatric:         Mood and Affect: Mood normal.         Behavior: Behavior normal.         ASSESSMENT:       ICD-10-CM ICD-9-CM    1. Encounter for well woman exam with routine gynecological exam  Z01.419 V72.31 Liquid-Based Pap Smear, Screening      HPV High Risk Genotypes, PCR      2. Encounter for screening mammogram for malignant neoplasm of breast  Z12.31 V76.12 Mammo Digital Screening Bilat w/ Abdiel      3. Encounter for Papanicolaou smear for cervical cancer screening  Z12.4 V76.2 Liquid-Based Pap Smear, Screening      HPV High Risk Genotypes, PCR      4. Decreased libido  R68.82 799.81              Plan:      Zenaida was seen today for well woman.    Diagnoses and all orders for this visit:    Encounter for well woman exam with routine gynecological exam  -     Liquid-Based Pap Smear, Screening  -     HPV High  Risk Genotypes, PCR    Encounter for screening mammogram for malignant neoplasm of breast  -     Mammo Digital Screening Bilat w/ Abdiel; Future    Encounter for Papanicolaou smear for cervical cancer screening  -     Liquid-Based Pap Smear, Screening  -     HPV High Risk Genotypes, PCR    Decreased libido     We discussed the complex nature of decreased desire in women. Contributing factors can include medical problems, mental health disorders like anxiety depression, medication side effects, substance abuse, stress, relationship status, sexual pain, trauma, hormonal issues, etc. Usually there are a combination of factors.   Medical therapy for pre-menopausal women is limited and often less effective. Medical options were reviewed including potential for out of pocket cost as well as administration routes and possible side effects. Post-menopausal women may respond to testosterone therapy. Discussed why this therapy is not typically used in pre-menopausal women.  Heydi margo was discussed and recommended.   I have asked Ms. Sung to look into these lifestyle factors/treatments and monitor her period. We discussed other signs/sx menopause and that 1 year amenorrhea marks completion of menopause. I recommend symptom based treatment and thus typically do not need labs unless desired by pt. We agreed not to check hormones today and to monitor sx as above. I asked that she notify me before 1 year if symptoms worsen/progress/develop.     Orders Placed This Encounter   Procedures    HPV High Risk Genotypes, PCR    Mammo Digital Screening Bilat w/ Abdiel       F/u for [employer] or sooner as needed.    Adenike Headley

## 2023-11-12 ENCOUNTER — PATIENT MESSAGE (OUTPATIENT)
Dept: DIABETES | Facility: CLINIC | Age: 48
End: 2023-11-12
Payer: COMMERCIAL

## 2024-01-04 ENCOUNTER — OFFICE VISIT (OUTPATIENT)
Dept: ENDOCRINOLOGY | Facility: CLINIC | Age: 49
End: 2024-01-04
Payer: COMMERCIAL

## 2024-01-04 VITALS
HEART RATE: 75 BPM | OXYGEN SATURATION: 95 % | WEIGHT: 198.44 LBS | HEIGHT: 65 IN | DIASTOLIC BLOOD PRESSURE: 80 MMHG | BODY MASS INDEX: 33.06 KG/M2 | TEMPERATURE: 98 F | SYSTOLIC BLOOD PRESSURE: 122 MMHG

## 2024-01-04 DIAGNOSIS — E03.9 HYPOTHYROIDISM, UNSPECIFIED TYPE: Primary | ICD-10-CM

## 2024-01-04 DIAGNOSIS — E55.9 VITAMIN D DEFICIENCY: ICD-10-CM

## 2024-01-04 DIAGNOSIS — E66.9 OBESITY (BMI 30-39.9): ICD-10-CM

## 2024-01-04 DIAGNOSIS — E61.1 IRON DEFICIENCY: ICD-10-CM

## 2024-01-04 DIAGNOSIS — N95.1 PERIMENOPAUSE: ICD-10-CM

## 2024-01-04 PROCEDURE — 99203 OFFICE O/P NEW LOW 30 MIN: CPT | Mod: S$GLB,,, | Performed by: PHYSICIAN ASSISTANT

## 2024-01-04 PROCEDURE — 99999 PR PBB SHADOW E&M-EST. PATIENT-LVL III: CPT | Mod: PBBFAC,,, | Performed by: PHYSICIAN ASSISTANT

## 2024-01-04 NOTE — PROGRESS NOTES
Chief Complaint: Hypothyroidism    HPI:  Zenaida Sung is 48 y.o. female with preexisting thyroid disease. Diagnosed in 2014 , presented with symptoms of weight gain of 50+ pounds over 6 months and fatigue   Found to abnormal TFTs on routine labs performed and was found to be hypothyroid  Dx 10 years qgo. Her aunts has hypothyroidism. On Ozempic from another physician. Receives testosterone pellets. Hx of iron deficiency. Last seen by MELLO Spivey NP in 2/17.     Current Medications: Levothyroxine 75 mcg - 1 tab po daily.      Patient reports good compliance with taking medication as prescribed   Takes LT4 separate from food and other medications first thing in the morning with coffee.   Pt reports waiting 45mins - 1 hour before eating or taking other medications      +constipation  +inability to lose weight   + thinning hair and nails   + cold intolerance   + fatigue   + wt gain    Reports hx of prediabetes.     Wt Readings from Last 10 Encounters:   01/04/24 90 kg (198 lb 6.6 oz)   03/27/23 88.5 kg (195 lb)   03/16/23 89.7 kg (197 lb 12 oz)   05/29/19 81.6 kg (180 lb)   05/06/19 81.6 kg (180 lb)   05/06/19 81.6 kg (180 lb)   04/24/19 85.3 kg (188 lb)   03/20/19 85.3 kg (188 lb)   05/09/18 85.4 kg (188 lb 4.4 oz)   04/18/18 85.6 kg (188 lb 11.4 oz)      Review of Systems   Constitutional:  Positive for fatigue. Negative for unexpected weight change.   HENT:  Negative for trouble swallowing and voice change.    Eyes:  Negative for visual disturbance.   Respiratory:  Negative for cough and shortness of breath.    Cardiovascular:  Negative for chest pain and palpitations.   Gastrointestinal:  Positive for abdominal distention. Negative for abdominal pain, constipation, diarrhea, nausea and vomiting.   Endocrine: Positive for cold intolerance. Negative for heat intolerance, polydipsia, polyphagia and polyuria.   Genitourinary:  Negative for dysuria and menstrual problem.   Musculoskeletal:  Negative for back pain,  "neck pain and neck stiffness.   Skin:  Negative for rash and wound.   Allergic/Immunologic: Negative for immunocompromised state.   Neurological:  Negative for dizziness, tremors, weakness, light-headedness and headaches.   Hematological:  Does not bruise/bleed easily.   Psychiatric/Behavioral:  Negative for confusion and sleep disturbance.      Menses: Regular   Reproductive history:      /80 (BP Location: Right arm, Patient Position: Sitting, BP Method: Large (Manual))   Pulse 75   Temp 98.2 °F (36.8 °C) (Oral)   Ht 5' 5" (1.651 m)   Wt 90 kg (198 lb 6.6 oz)   SpO2 95%   BMI 33.02 kg/m²        Testosterone 228  FSH 5.6  Estradiol 132    / [facility]  Cmp wnl  H/H 11.5/33.9  Vb12 >2000  FSH 3.3  Estradiol 253  Tt 13  VD 44    Physical Exam  Vitals and nursing note reviewed.   Constitutional:       General: She is not in acute distress.     Appearance: She is well-developed.   HENT:      Right Ear: External ear normal.      Left Ear: External ear normal.      Nose: Nose normal.   Neck:      Thyroid: No thyromegaly.      Trachea: No tracheal deviation.   Cardiovascular:      Rate and Rhythm: Normal rate and regular rhythm.      Heart sounds: No murmur heard.  Pulmonary:      Effort: Pulmonary effort is normal.      Breath sounds: Normal breath sounds.   Abdominal:      Palpations: Abdomen is soft.      Tenderness: There is no abdominal tenderness.      Hernia: No hernia is present.   Musculoskeletal:      Cervical back: Normal range of motion and neck supple.      Comments: Gait normal  No clubbing or cyanosis noted   Lymphadenopathy:      Cervical: No cervical adenopathy.   Skin:     General: Skin is warm and dry.      Findings: No rash.      Comments: No nodules       Neurological:      Mental Status: She is alert and oriented to person, place, and time.      Cranial Nerves: No cranial nerve deficit.      Deep Tendon Reflexes: Reflexes normal.   Psychiatric:         Judgment: " Judgment normal.       Assessment and Plan:  1. Hypothyroidism, unspecified type  T4, Free    TSH    T4, Free    TSH    CANCELED: TSH    CANCELED: T4, Free      2. Obesity (BMI 30-39.9)  Hemoglobin A1C    TSH    Fructosamine    Insulin, Random    T4, Free    TSH    Fructosamine    Insulin, Random    T4, Free    CANCELED: Insulin, Random    CANCELED: Fructosamine    CANCELED: TSH    CANCELED: T4, Free      3. Perimenopause  Luteinizing Hormone    Follicle Stimulating Hormone    Luteinizing Hormone    Follicle Stimulating Hormone    Testosterone, free    Testosterone, free    CANCELED: Follicle Stimulating Hormone    CANCELED: Luteinizing Hormone      4. Iron deficiency  Iron and TIBC    Iron and TIBC    CANCELED: Iron and TIBC      5. Vitamin D deficiency           1. Hypothyroidism due to acquired atrophy of thyroid  - TPO   - clinically euthyroid   -TFTs today. Continue 75 mcg qd.  - discussed that symptoms of hypothyroidism may not always correlate with TSH and a normal TSH is the goal of therapy   - reviewed usual times of thyroid hormone changes: weight gain, start /stop OCP's , attempting conception and pregnancy   - avoid exogenous hyperthyroidism as this can accelerate bone loss and increase CV complications     2. Vitamin D deficiency      Stable  Continue intake    3. Obesity, Class I, BMI 30-34.9  - increases insulin resistance   - alerted as to the increased risk of diabetes   - recommend periodic A1c  - encouraged exercise and weight loss   - encouraged to keep food diary   -     Hemoglobin A1c; Future; Expected date: 2/15/17      4.  Iron deficiency   Check iron             5. Perimenopause  Check fsh/lh      Fasting labs--needs refill after labs  RTC in 6 months for follow up with labs prior -TFTs

## 2024-01-13 LAB
FRUCTOSAMINE SERPL-SCNC: 208 UMOL/L (ref 205–285)
FSH SERPL-ACNC: 9.6 MIU/ML
HBA1C MFR BLD: 5.7 % OF TOTAL HGB
INSULIN SERPL-ACNC: 8.8 UIU/ML
IRON SATN MFR SERPL: 25 % (CALC) (ref 16–45)
IRON SERPL-MCNC: 69 MCG/DL (ref 45–160)
LH SERPL-ACNC: 3.9 MIU/ML
T4 FREE SERPL-MCNC: 1.3 NG/DL (ref 0.8–1.8)
TESTOST FREE SERPL-MCNC: 17.2 PG/ML (ref 0.2–5)
TIBC SERPL-MCNC: 281 MCG/DL (CALC) (ref 250–450)
TSH SERPL-ACNC: 2.66 MIU/L

## 2024-01-14 DIAGNOSIS — E03.9 HYPOTHYROIDISM, UNSPECIFIED TYPE: Primary | ICD-10-CM

## 2024-01-14 RX ORDER — LEVOTHYROXINE SODIUM 88 UG/1
88 TABLET ORAL
Qty: 30 TABLET | Refills: 11 | Status: SHIPPED | OUTPATIENT
Start: 2024-01-14 | End: 2025-01-13

## 2024-01-19 ENCOUNTER — TELEPHONE (OUTPATIENT)
Dept: ENDOCRINOLOGY | Facility: CLINIC | Age: 49
End: 2024-01-19
Payer: COMMERCIAL

## 2024-07-31 ENCOUNTER — OFFICE VISIT (OUTPATIENT)
Dept: ENDOCRINOLOGY | Facility: CLINIC | Age: 49
End: 2024-07-31
Payer: COMMERCIAL

## 2024-07-31 DIAGNOSIS — E55.9 HYPOVITAMINOSIS D: ICD-10-CM

## 2024-07-31 DIAGNOSIS — E03.9 HYPOTHYROIDISM, UNSPECIFIED TYPE: Primary | ICD-10-CM

## 2024-07-31 DIAGNOSIS — N95.1 PERIMENOPAUSE: ICD-10-CM

## 2024-07-31 DIAGNOSIS — E66.9 OBESITY (BMI 30-39.9): ICD-10-CM

## 2024-07-31 DIAGNOSIS — E61.1 IRON DEFICIENCY: ICD-10-CM

## 2024-07-31 PROCEDURE — 99213 OFFICE O/P EST LOW 20 MIN: CPT | Mod: 95,,, | Performed by: PHYSICIAN ASSISTANT

## 2024-07-31 RX ORDER — LEVOTHYROXINE SODIUM 88 UG/1
88 TABLET ORAL
Qty: 90 TABLET | Refills: 3 | Status: SHIPPED | OUTPATIENT
Start: 2024-07-31

## 2024-07-31 NOTE — PROGRESS NOTES
Chief Complaint: Hypothyroidism    The patient location is: Home  The chief complaint leading to consultation is: Hypothyroidism    Visit type: audiovisual    Face to Face time with patient: 5 min  10 minutes of total time spent on the encounter, which includes face to face time and non-face to face time preparing to see the patient (eg, review of tests), Obtaining and/or reviewing separately obtained history, Documenting clinical information in the electronic or other health record, Independently interpreting results (not separately reported) and communicating results to the patient/family/caregiver, or Care coordination (not separately reported).     Each patient to whom he or she provides medical services by telemedicine is:  (1) informed of the relationship between the physician and patient and the respective role of any other health care provider with respect to management of the patient; and (2) notified that he or she may decline to receive medical services by telemedicine and may withdraw from such care at any time.    HPI:  Zenaida Sung is 48 y.o. female with preexisting thyroid disease. Diagnosed in 2014 , presented with symptoms of weight gain of 50+ pounds over 6 months and fatigue   Found to abnormal TFTs on routine labs performed and was found to be hypothyroid  Dx 10 years qgo. Her aunts has hypothyroidism. On Ozempic from another physician. Receives testosterone pellets. Hx of iron deficiency. Last seen by MELLO Spivey NP in 2/17.     Current Medications: Levothyroxine 88 mcg - 1 tab po daily.      Patient reports good compliance with taking medication as prescribed   Takes LT4 separate from food and other medications first thing in the morning with coffee.   Pt reports waiting 45mins - 1 hour before eating or taking other medications      +constipation  +inability to lose weight   + thinning hair and nails   + fatigue     No wt changes, palpitations, cold intolerance, tremors or  sweating.    Reports hx of prediabetes.     Wt Readings from Last 10 Encounters:   24 90 kg (198 lb 6.6 oz)   23 88.5 kg (195 lb)   23 89.7 kg (197 lb 12 oz)   19 81.6 kg (180 lb)   19 81.6 kg (180 lb)   19 81.6 kg (180 lb)   19 85.3 kg (188 lb)   19 85.3 kg (188 lb)   18 85.4 kg (188 lb 4.4 oz)   18 85.6 kg (188 lb 11.4 oz)      ROS:   Constitutional: No recent significant weight change  Eyes: No recent visual changes  Cardiovascular: Denies current anginal symptoms  Respiratory: Denies current respiratory difficulty  Gastrointestinal: Denies recent bowel disturbances  GenitoUrinary - No dysuria  Skin: No new skin rash  Neurologic: No focal neurologic complaints  Remainder ROS negative   Menses: Regular   Reproductive history:      There were no vitals taken for this visit.   24  Tsh 1.80  Ft4 1.3  Ft3 2.8  H/h 12.5/37.5  Cmp wnl  Vb12 694  FSH 26.2  Estradiol 45  Test 144  Vd 52      Testosterone 228  FSH 5.6  Estradiol 132    23 [facility]  Cmp wnl  H/H 11.5/33.9  Vb12 >2000  FSH 3.3  Estradiol 253  Tt 13  VD 44    PE:  GENERAL: Well developed, well nourished  RESPIRATORY: Normal effort, clear to auscultation  PSYCH: normal mood and affect    Assessment and Plan:  No diagnosis found.     1. Hypothyroidism due to acquired atrophy of thyroid  - TPO   - clinically euthyroid   -TFTs wnl. Continue 88 mcg qd.  - discussed that symptoms of hypothyroidism may not always correlate with TSH and a normal TSH is the goal of therapy   - reviewed usual times of thyroid hormone changes: weight gain, start /stop OCP's , attempting conception and pregnancy   - avoid exogenous hyperthyroidism as this can accelerate bone loss and increase CV complications     2. Vitamin D deficiency      Stable  Continue intake    3. Obesity, Class I, BMI 30-34.9  - increases insulin resistance   - alerted as to the increased risk of diabetes   - recommend  periodic A1c  - encouraged exercise and weight loss   - encouraged to keep food diary       4.  Iron deficiency  monitor             5. Perimenopause  Check fsh/lh      FOLLOWUP  RTC in 1 YEAR follow up with labs prior -TFTs, VD, iron, fsh, lh

## 2024-09-09 ENCOUNTER — OFFICE VISIT (OUTPATIENT)
Dept: PODIATRY | Facility: CLINIC | Age: 49
End: 2024-09-09
Payer: COMMERCIAL

## 2024-09-09 VITALS
HEIGHT: 65 IN | SYSTOLIC BLOOD PRESSURE: 129 MMHG | BODY MASS INDEX: 34.22 KG/M2 | WEIGHT: 205.38 LBS | DIASTOLIC BLOOD PRESSURE: 84 MMHG | HEART RATE: 64 BPM

## 2024-09-09 DIAGNOSIS — M72.2 PLANTAR FASCIITIS OF RIGHT FOOT: Primary | ICD-10-CM

## 2024-09-09 DIAGNOSIS — M79.671 PAIN OF RIGHT HEEL: ICD-10-CM

## 2024-09-09 PROCEDURE — 99203 OFFICE O/P NEW LOW 30 MIN: CPT | Mod: S$GLB,,, | Performed by: PODIATRIST

## 2024-09-09 PROCEDURE — 99999 PR PBB SHADOW E&M-EST. PATIENT-LVL III: CPT | Mod: PBBFAC,,, | Performed by: PODIATRIST

## 2024-09-09 RX ORDER — METFORMIN HYDROCHLORIDE 500 MG/1
1 TABLET, EXTENDED RELEASE ORAL 2 TIMES DAILY
COMMUNITY
Start: 2024-07-26

## 2024-09-09 RX ORDER — DICLOFENAC SODIUM 10 MG/G
2 GEL TOPICAL 4 TIMES DAILY
Qty: 350 G | Refills: 2 | Status: SHIPPED | OUTPATIENT
Start: 2024-09-09

## 2024-09-09 NOTE — PROGRESS NOTES
Subjective:      Patient ID: Zenaida Sung is a 48 y.o. female.    Chief Complaint: Heel Pain (Rig ht heel pain)    Zenaida is a 48 y.o. female who presents new to the clinic c/o heel pain in the R foot, especially w/ the 1st step in the morning or after sitting. The pain is described as painful. Has been about 2-1/2 wks.of increasing pain; wearing Oofos only. Usually sits @ desk in home so barefoot & sometimes sandals, occasionally tennis shoes; wears tennis shoes out mostly. Zenaida rates the pain as 8/10. She denies a h/o trauma.     Shoe gear: Birkenstock-style sandals     PCP Ruth Abdi, TOAN  Endo.: Tete Peña PA-C 7/31/24    On Metformin x 1 month. Was previously on Ozempic x 2 yrs.  Past Medical History:   Diagnosis Date    Allergy     Hypothyroidism     New-onset Mild persistent asthma without complication: Symptomatic 4/18/2018     Patient Active Problem List   Diagnosis    Hypothyroidism due to acquired atrophy of thyroid    Vitamin D deficiency    Obesity, Class I, BMI 30-34.9    New-onset Mild persistent asthma without complication: Symptomatic    Stress incontinence of urine      Objective:      Review of Systems   Constitutional: Positive for malaise/fatigue and weight gain.   Cardiovascular:  Negative for leg swelling.   Skin:  Negative for color change and suspicious lesions.   Musculoskeletal:  Positive for myalgias. Negative for falls.   Neurological:  Negative for focal weakness, loss of balance and sensory change.   Psychiatric/Behavioral:  The patient is not nervous/anxious.      Physical Exam  Vitals reviewed.   Constitutional:       General: She is not in acute distress.     Appearance: She is well-developed. She is obese.   Cardiovascular:      Pulses: Normal pulses.           Dorsalis pedis pulses are 2+ on the right side and 2+ on the left side.   Musculoskeletal:         General: Tenderness present. No swelling or signs of injury.        Feet:    Feet:      Right foot:       Skin integrity: Skin integrity normal.      Left foot:      Skin integrity: Skin integrity normal.      Comments: Equinus B/L ankles w/ < 90 deg foot to leg noted w/ knees extended.      MS strength of extrinsics to foot & ankle B/L + 5/5 in DF/PF/Inv/Ev to resistance w/ no reproduction of pain in any direction.   TTP plantar fascial insertion R heel medially w/out radiation; no TTP along PTT.    Passive ROM of ankle & pedal joints is painless & w/out crepitation B/L.     Skin:     General: Skin is warm and dry.      Capillary Refill: Capillary refill takes less than 2 seconds.      Findings: No signs of injury or lesion.   Neurological:      Mental Status: She is alert and oriented to person, place, and time.      Sensory: Sensation is intact.      Motor: Motor function is intact. No weakness.      Gait: Gait is intact. Gait normal.   Psychiatric:         Mood and Affect: Mood and affect normal.         Behavior: Behavior normal. Behavior is cooperative.         Assessment:      Encounter Diagnoses   Name Primary?    Plantar fasciitis of right foot Yes    Pain of right heel        Plan:       Zenaida was seen today for heel pain.    Diagnoses and all orders for this visit:    Plantar fasciitis of right foot  -     diclofenac sodium (VOLTAREN) 1 % Gel; Apply 2 g topically 4 (four) times daily.    Pain of right heel    I counseled the patient on her conditions, their implications & medical mgmt.    -Discussed general issues surrounding plantar fasciitis, along w/ the advantages & disadvantages of various tx strategies.  -Discussed shoe choice.   The importance of wearing shoes w/ adequate arch supports to alleviate abnormal pressure & improve stability of foot while walking.   Avoid flat shoes or barefoot walking as these will exacerbate or worsen symptoms.  .   -Discussed non-prescription inserts.  PPT arch pads added to current shoe gear .  Dispensed Silopos gelstrap for use in all other shoe gear w/out MLA  support, including @ home.  -Discussed Rx/ OTC topical NSAID such as Diclofenac 1% gel up to qid prn &/ OR PO NSAID.   Will try topical Voltaren gel.  -Other options include PO Medrol dosepak prior to PO NSAID &/ OR injection of LA & steroid.     F/u 3-4 wks., sooner prn.        A total of 35 mins.was spent on chart review, patient visit & documentation.

## 2024-09-09 NOTE — PATIENT INSTRUCTIONS
PPT arch pads w/ adhesive - medium    Visco-gel universal metatarsal strap - large/ xlarge right (or left)

## 2024-09-17 NOTE — PROGRESS NOTES
Subjective:      Patient ID: Zenaida Sung is a 48 y.o. female.    Chief Complaint: No chief complaint on file.    Patient is here for F/U R heel pain.  Pain level 4/10.  States arch pads helped but still has throbbing when she is driving.  9/9/24  Zenaida is a 48 y.o. female who presents new to the clinic c/o heel pain in the R foot, especially w/ the 1st step in the morning or after sitting. The pain is described as painful. Has been about 2-1/2 wks.of increasing pain; wearing Oofos only. Usually sits @ desk in home so barefoot & sometimes sandals, occasionally tennis shoes; wears tennis shoes out mostly. Zenaida rates the pain as 8/10. She denies a h/o trauma.     Shoe gear: Birkenstock-style sandals     PCP Ruth Abdi, TOAN  Endo.: Tete Peña PA-C 7/31/24    On Metformin x 1 month. Was previously on Ozempic x 2 yrs.  Past Medical History:   Diagnosis Date    Allergy     Hypothyroidism     New-onset Mild persistent asthma without complication: Symptomatic 4/18/2018     Patient Active Problem List   Diagnosis    Hypothyroidism due to acquired atrophy of thyroid    Vitamin D deficiency    Obesity, Class I, BMI 30-34.9    New-onset Mild persistent asthma without complication: Symptomatic    Stress incontinence of urine      Objective:      Review of Systems   Constitutional: Positive for malaise/fatigue and weight gain.   Cardiovascular:  Negative for leg swelling.   Skin:  Negative for color change and suspicious lesions.   Musculoskeletal:  Positive for myalgias. Negative for falls.   Neurological:  Negative for focal weakness, loss of balance and sensory change.   Psychiatric/Behavioral:  The patient is not nervous/anxious.      Physical Exam  Vitals reviewed.   Constitutional:       General: She is not in acute distress.     Appearance: She is well-developed. She is obese.   Cardiovascular:      Pulses: Normal pulses.           Dorsalis pedis pulses are 2+ on the right side and 2+ on the left side.    Musculoskeletal:         General: Tenderness present. No swelling or signs of injury.        Feet:    Feet:      Right foot:      Skin integrity: Skin integrity normal.      Left foot:      Skin integrity: Skin integrity normal.      Comments: Equinus B/L ankles w/ < 90 deg foot to leg noted w/ knees extended.      MS strength of extrinsics to foot & ankle B/L + 5/5 in DF/PF/Inv/Ev to resistance w/ no reproduction of pain in any direction.   TTP plantar fascial insertion R heel medially w/out radiation; no TTP along PTT.    Passive ROM of ankle & pedal joints is painless & w/out crepitation B/L.   Skin:     General: Skin is warm and dry.      Capillary Refill: Capillary refill takes less than 2 seconds.      Findings: No signs of injury or lesion.   Neurological:      Mental Status: She is alert and oriented to person, place, and time.      Sensory: Sensation is intact.      Motor: Motor function is intact. No weakness or abnormal muscle tone.      Gait: Gait is intact. Gait normal.   Psychiatric:         Mood and Affect: Mood and affect normal.         Behavior: Behavior normal. Behavior is cooperative.         Assessment:      Encounter Diagnosis   Name Primary?    Plantar fasciitis of right foot Yes     Problem List Items Addressed This Visit    None  Visit Diagnoses       Plantar fasciitis of right foot    -  Primary    Pain of right heel                 Plan:       Diagnoses and all orders for this visit:    Plantar fasciitis of right foot    I counseled the patient on her conditions, their implications & medical mgmt.    Continue shoes w/ adequate arch supports to alleviate abnormal pressure & improve stability of foot while walking.   Avoid flat shoes or barefoot walking as these will exacerbate or worsen symptoms.  .   -PPT arch pads moved more proximally in current shoe gear. Continue Silopos gelstrap in other shoe gear w/out MLA support, including @ home.  Continue topical Voltaren gel up to  q.i.d..  Injection of LA & steroid.     F/u 3-4 wks., sooner prn.        A total of 25 mins.was spent on chart review, patient visit & documentation.

## 2024-10-07 ENCOUNTER — OFFICE VISIT (OUTPATIENT)
Dept: PODIATRY | Facility: CLINIC | Age: 49
End: 2024-10-07
Payer: COMMERCIAL

## 2024-10-07 VITALS
BODY MASS INDEX: 34.08 KG/M2 | HEART RATE: 77 BPM | HEIGHT: 65 IN | SYSTOLIC BLOOD PRESSURE: 124 MMHG | WEIGHT: 204.56 LBS | DIASTOLIC BLOOD PRESSURE: 83 MMHG

## 2024-10-07 DIAGNOSIS — M72.2 PLANTAR FASCIITIS OF RIGHT FOOT: Primary | ICD-10-CM

## 2024-10-07 DIAGNOSIS — M79.671 PAIN OF RIGHT HEEL: ICD-10-CM

## 2024-10-07 PROCEDURE — 99999 PR PBB SHADOW E&M-EST. PATIENT-LVL III: CPT | Mod: PBBFAC,,, | Performed by: PODIATRIST

## 2024-10-07 RX ADMIN — DEXAMETHASONE SODIUM PHOSPHATE 4 MG: 4 INJECTION, SOLUTION INTRA-ARTICULAR; INTRALESIONAL; INTRAMUSCULAR; INTRAVENOUS; SOFT TISSUE at 11:10

## 2024-10-07 RX ADMIN — METHYLPREDNISOLONE ACETATE 40 MG: 40 INJECTION, SUSPENSION INTRA-ARTICULAR; INTRALESIONAL; INTRAMUSCULAR; SOFT TISSUE at 11:10

## 2024-10-17 RX ORDER — DEXAMETHASONE SODIUM PHOSPHATE 4 MG/ML
4 INJECTION, SOLUTION INTRA-ARTICULAR; INTRALESIONAL; INTRAMUSCULAR; INTRAVENOUS; SOFT TISSUE
Status: DISCONTINUED | OUTPATIENT
Start: 2024-10-07 | End: 2024-10-17 | Stop reason: HOSPADM

## 2024-10-17 RX ORDER — METHYLPREDNISOLONE ACETATE 40 MG/ML
40 INJECTION, SUSPENSION INTRA-ARTICULAR; INTRALESIONAL; INTRAMUSCULAR; SOFT TISSUE
Status: DISCONTINUED | OUTPATIENT
Start: 2024-10-07 | End: 2024-10-17 | Stop reason: HOSPADM

## 2024-10-18 NOTE — PROCEDURES
Injection medial heel: R plantar fascia    Date/Time: 10/7/2024 11:30 AM    Performed by: Karrie Calles DPM  Authorized by: Karrie Calles DPM    Consent Done?:  Yes (Verbal)  Indications:  Pain  Local anesthesia used?: Yes    Anesthesia:  Local infiltration  Local anesthetic:  Bupivacaine 0.5% without epinephrine, lidocaine 2% without epinephrine and topical anesthetic  Anesthetic total (ml):  1    Location:  Foot  Site:  R plantar fascia  Needle size:  25 G  Approach:  Medial  Medications:  4 mg dexAMETHasone 4 mg/mL; 40 mg methylPREDNISolone acetate 40 mg/mL  Patient tolerance:  Patient tolerated the procedure well with no immediate complications

## 2024-11-27 ENCOUNTER — OFFICE VISIT (OUTPATIENT)
Dept: PODIATRY | Facility: CLINIC | Age: 49
End: 2024-11-27
Payer: COMMERCIAL

## 2024-11-27 VITALS
BODY MASS INDEX: 34.63 KG/M2 | HEIGHT: 65 IN | HEART RATE: 75 BPM | WEIGHT: 207.88 LBS | DIASTOLIC BLOOD PRESSURE: 73 MMHG | SYSTOLIC BLOOD PRESSURE: 115 MMHG

## 2024-11-27 DIAGNOSIS — M72.2 PLANTAR FASCIITIS OF RIGHT FOOT: ICD-10-CM

## 2024-11-27 DIAGNOSIS — M79.671 PAIN OF RIGHT HEEL: Primary | ICD-10-CM

## 2024-11-27 PROCEDURE — 99213 OFFICE O/P EST LOW 20 MIN: CPT | Mod: S$GLB,,, | Performed by: PODIATRIST

## 2024-11-27 PROCEDURE — 99999 PR PBB SHADOW E&M-EST. PATIENT-LVL III: CPT | Mod: PBBFAC,,, | Performed by: PODIATRIST

## 2024-11-27 RX ORDER — LEVOTHYROXINE, LIOTHYRONINE 57; 13.5 UG/1; UG/1
90 TABLET ORAL
COMMUNITY
Start: 2024-11-22

## 2024-11-27 NOTE — PROGRESS NOTES
Subjective:      Patient ID: Zenaida Sung is a 49 y.o. female.    Chief Complaint: Follow-up and Heel Pain (Right heel)    Patient is here for R heel pain f/u. States she is fine except hurts ocassionally if on it all day. Was to avoid flat shoes or barefoot, PPT arch pads placed in current shoe gear & was to continue Silopos gelstrap in other shoe gear w/out MLA support, topical Voltaren gel up to q.i.d. Had injection of LA & steroid - states it took @ least 2-3 days before had relief.   10/7/24  Patient is here for F/U R heel pain. Pain level 4/10. States arch pads helped but still has throbbing when she is driving.  9/9/24  Zenaida is a 49 y.o. female who presents new to the clinic c/o heel pain in the R foot, especially w/ the 1st step in the morning or after sitting. The pain is described as painful. Has been about 2-1/2 wks.of increasing pain; wearing Oofos only. Usually sits @ desk in home so barefoot & sometimes sandals, occasionally tennis shoes; wears tennis shoes out mostly. Zenaida rates the pain as 8/10. She denies a h/o trauma.     Shoe gear: Birkenstock-style sandals     PCP Ruth Abdi, TOAN  Endo.: Tete Peña PA-C 7/31/24    On Metformin x 1 month. Was previously on Ozempic x 2 yrs.  Past Medical History:   Diagnosis Date    Allergy     Hypothyroidism     New-onset Mild persistent asthma without complication: Symptomatic 4/18/2018     Patient Active Problem List   Diagnosis    Hypothyroidism due to acquired atrophy of thyroid    Vitamin D deficiency    Obesity, Class I, BMI 30-34.9    New-onset Mild persistent asthma without complication: Symptomatic    Stress incontinence of urine      Objective:      Review of Systems   Constitutional: Positive for malaise/fatigue and weight gain.   Cardiovascular:  Negative for leg swelling.   Skin:  Negative for color change and suspicious lesions.   Musculoskeletal:  Positive for myalgias. Negative for falls.   Neurological:  Negative for focal  weakness, loss of balance and sensory change.   Psychiatric/Behavioral:  The patient is not nervous/anxious.      Physical Exam  Vitals reviewed.   Constitutional:       General: She is not in acute distress.     Appearance: She is well-developed. She is obese.   Cardiovascular:      Pulses: Normal pulses.           Dorsalis pedis pulses are 2+ on the right side and 2+ on the left side.   Musculoskeletal:         General: Tenderness present. No swelling or signs of injury.        Feet:    Feet:      Right foot:      Skin integrity: Skin integrity normal.      Left foot:      Skin integrity: Skin integrity normal.      Comments: Equinus B/L ankles w/ < 90 deg foot to leg noted w/ knees extended.      MS strength of extrinsics to foot & ankle B/L + 5/5 in DF/PF/Inv/Ev to resistance w/ no reproduction of pain in any direction.   TTP plantar fascial insertion R heel medially w/out radiation; no TTP along PTT.    Passive ROM of ankle & pedal joints is painless & w/out crepitation B/L.     PPT arch pad has slid forward 1/2 way do inadequate MLA proximal half R.  Skin:     General: Skin is warm and dry.      Capillary Refill: Capillary refill takes less than 2 seconds.      Findings: No signs of injury or lesion.   Neurological:      Mental Status: She is alert and oriented to person, place, and time.      Sensory: Sensation is intact.      Motor: Motor function is intact. No weakness or abnormal muscle tone.      Gait: Gait is intact. Gait normal.   Psychiatric:         Mood and Affect: Mood and affect normal.         Behavior: Behavior normal. Behavior is cooperative.         Assessment:      Encounter Diagnoses   Name Primary?    Pain of right heel Yes    Plantar fasciitis of right foot        Problem List Items Addressed This Visit    None  Visit Diagnoses       Pain of right heel    -  Primary    Plantar fasciitis of right foot                 Plan:       Zenaida was seen today for follow-up and heel  pain.    Diagnoses and all orders for this visit:    Pain of right heel    Plantar fasciitis of right foot    I counseled the patient on her conditions, their implications & medical mgmt.    Continue shoes w/ adequate arch supports to alleviate abnormal pressure & improve stability of foot while walking.   Avoid flat shoes or barefoot walking as these will exacerbate or worsen symptoms.  .   -PPT arch pads moved back again in current shoe gear.   Continue Silopos gelstrap in other shoe gear w/out MLA support, including @ home.  Continue topical Voltaren gel up to q.i.d.    F/u prn.        A total of 27 mins.was spent on chart review, patient visit & documentation.

## 2025-08-20 ENCOUNTER — OFFICE VISIT (OUTPATIENT)
Dept: OBSTETRICS AND GYNECOLOGY | Facility: CLINIC | Age: 50
End: 2025-08-20
Attending: OBSTETRICS & GYNECOLOGY
Payer: COMMERCIAL

## 2025-08-20 VITALS — WEIGHT: 188.5 LBS | DIASTOLIC BLOOD PRESSURE: 70 MMHG | SYSTOLIC BLOOD PRESSURE: 122 MMHG | BODY MASS INDEX: 31.37 KG/M2

## 2025-08-20 DIAGNOSIS — Z12.31 BREAST CANCER SCREENING BY MAMMOGRAM: Primary | ICD-10-CM

## 2025-08-20 DIAGNOSIS — Z01.419 ENCOUNTER FOR GYNECOLOGICAL EXAMINATION (GENERAL) (ROUTINE) WITHOUT ABNORMAL FINDINGS: ICD-10-CM

## 2025-08-20 PROCEDURE — 99999 PR PBB SHADOW E&M-EST. PATIENT-LVL III: CPT | Mod: PBBFAC,,, | Performed by: OBSTETRICS & GYNECOLOGY

## (undated) DEVICE — PACK LAPSCP/PELVSCPY III TIBRN

## (undated) DEVICE — GLOVE BIOGEL SKINSENSE PI 7.0

## (undated) DEVICE — Device

## (undated) DEVICE — SEE MEDLINE ITEM 154981

## (undated) DEVICE — SKINMARKER & RULER REGULAR X-F

## (undated) DEVICE — UNDERGLOVES BIOGEL PI SZ 7 LF

## (undated) DEVICE — SUT 2/0 30IN SILK BLACK

## (undated) DEVICE — SOL 9P NACL IRR PIC IL

## (undated) DEVICE — GLOVE BIOGEL SKINSENSE PI 6.5

## (undated) DEVICE — PAD PREP 50/CA

## (undated) DEVICE — GAUZE PACKING VAG XRAY 2X6

## (undated) DEVICE — LINER GLOVE POWDERFREE SZ 7

## (undated) DEVICE — SLEEVE SCD EXPRESS CALF LARGE

## (undated) DEVICE — DRAPE UNDER BUTTOCKS WITH POUCH

## (undated) DEVICE — SOL NS 1000CC

## (undated) DEVICE — NDL HYPO REG 25G X 1 1/2

## (undated) DEVICE — BLANKET UPPER BODY 78.7X29.9IN

## (undated) DEVICE — DRAPE STERI INSTRUMENT 1018

## (undated) DEVICE — SEE MEDLINE ITEM 157110

## (undated) DEVICE — ELECTRODE REM PLYHSV RETURN 9

## (undated) DEVICE — GLOVE SURGICAL LATEX SZ 6.5

## (undated) DEVICE — TRAY FOLEY 16FR INFECTION CONT

## (undated) DEVICE — BANDAGE DERMACEA STRETCH 4X1IN

## (undated) DEVICE — SUT VICRYL CTD 2-0 GI 27 SH